# Patient Record
Sex: MALE | Race: WHITE | URBAN - METROPOLITAN AREA
[De-identification: names, ages, dates, MRNs, and addresses within clinical notes are randomized per-mention and may not be internally consistent; named-entity substitution may affect disease eponyms.]

---

## 2019-12-05 ENCOUNTER — APPOINTMENT (OUTPATIENT)
Dept: GENERAL RADIOLOGY | Age: 64
DRG: 638 | End: 2019-12-05
Payer: MEDICARE

## 2019-12-05 ENCOUNTER — HOSPITAL ENCOUNTER (INPATIENT)
Age: 64
LOS: 2 days | Discharge: HOME | DRG: 638 | End: 2019-12-07
Attending: EMERGENCY MEDICINE | Admitting: FAMILY MEDICINE
Payer: MEDICARE

## 2019-12-05 DIAGNOSIS — L03.90 CELLULITIS, UNSPECIFIED CELLULITIS SITE: Primary | ICD-10-CM

## 2019-12-05 LAB
ANION GAP SERPL CALCULATED.3IONS-SCNC: 10 MMOL/L (ref 9–17)
BUN BLDV-MCNC: 13 MG/DL (ref 8–23)
BUN/CREAT BLD: ABNORMAL (ref 9–20)
C-REACTIVE PROTEIN: 40.6 MG/L (ref 0–5)
CALCIUM SERPL-MCNC: 8.9 MG/DL (ref 8.6–10.4)
CHLORIDE BLD-SCNC: 100 MMOL/L (ref 98–107)
CO2: 25 MMOL/L (ref 20–31)
CREAT SERPL-MCNC: 1.02 MG/DL (ref 0.7–1.2)
GFR AFRICAN AMERICAN: >60 ML/MIN
GFR NON-AFRICAN AMERICAN: >60 ML/MIN
GFR SERPL CREATININE-BSD FRML MDRD: ABNORMAL ML/MIN/{1.73_M2}
GFR SERPL CREATININE-BSD FRML MDRD: ABNORMAL ML/MIN/{1.73_M2}
GLUCOSE BLD-MCNC: 150 MG/DL (ref 70–99)
HCT VFR BLD CALC: 44.2 % (ref 40.7–50.3)
HEMOGLOBIN: 14.3 G/DL (ref 13–17)
MCH RBC QN AUTO: 31.4 PG (ref 25.2–33.5)
MCHC RBC AUTO-ENTMCNC: 32.4 G/DL (ref 28.4–34.8)
MCV RBC AUTO: 96.9 FL (ref 82.6–102.9)
NRBC AUTOMATED: 0 PER 100 WBC
PDW BLD-RTO: 12.4 % (ref 11.8–14.4)
PLATELET # BLD: 253 K/UL (ref 138–453)
PMV BLD AUTO: 10 FL (ref 8.1–13.5)
POTASSIUM SERPL-SCNC: 4.2 MMOL/L (ref 3.7–5.3)
RBC # BLD: 4.56 M/UL (ref 4.21–5.77)
SEDIMENTATION RATE, ERYTHROCYTE: 30 MM (ref 0–10)
SODIUM BLD-SCNC: 135 MMOL/L (ref 135–144)
WBC # BLD: 8.8 K/UL (ref 3.5–11.3)

## 2019-12-05 PROCEDURE — 85027 COMPLETE CBC AUTOMATED: CPT

## 2019-12-05 PROCEDURE — 99223 1ST HOSP IP/OBS HIGH 75: CPT | Performed by: NURSE PRACTITIONER

## 2019-12-05 PROCEDURE — 73630 X-RAY EXAM OF FOOT: CPT

## 2019-12-05 PROCEDURE — 99285 EMERGENCY DEPT VISIT HI MDM: CPT

## 2019-12-05 PROCEDURE — 80048 BASIC METABOLIC PNL TOTAL CA: CPT

## 2019-12-05 PROCEDURE — 2580000003 HC RX 258: Performed by: STUDENT IN AN ORGANIZED HEALTH CARE EDUCATION/TRAINING PROGRAM

## 2019-12-05 PROCEDURE — 96365 THER/PROPH/DIAG IV INF INIT: CPT

## 2019-12-05 PROCEDURE — 96366 THER/PROPH/DIAG IV INF ADDON: CPT

## 2019-12-05 PROCEDURE — 86140 C-REACTIVE PROTEIN: CPT

## 2019-12-05 PROCEDURE — 96367 TX/PROPH/DG ADDL SEQ IV INF: CPT

## 2019-12-05 PROCEDURE — 6360000002 HC RX W HCPCS: Performed by: STUDENT IN AN ORGANIZED HEALTH CARE EDUCATION/TRAINING PROGRAM

## 2019-12-05 PROCEDURE — 85651 RBC SED RATE NONAUTOMATED: CPT

## 2019-12-05 PROCEDURE — 83036 HEMOGLOBIN GLYCOSYLATED A1C: CPT

## 2019-12-05 PROCEDURE — 1200000000 HC SEMI PRIVATE

## 2019-12-05 PROCEDURE — 87040 BLOOD CULTURE FOR BACTERIA: CPT

## 2019-12-05 RX ORDER — FUROSEMIDE 40 MG/1
40 TABLET ORAL 2 TIMES DAILY
Status: DISCONTINUED | OUTPATIENT
Start: 2019-12-06 | End: 2019-12-07 | Stop reason: HOSPADM

## 2019-12-05 RX ORDER — ONDANSETRON 2 MG/ML
4 INJECTION INTRAMUSCULAR; INTRAVENOUS EVERY 6 HOURS PRN
Status: DISCONTINUED | OUTPATIENT
Start: 2019-12-05 | End: 2019-12-07 | Stop reason: HOSPADM

## 2019-12-05 RX ORDER — CARVEDILOL 6.25 MG/1
6.25 TABLET ORAL 2 TIMES DAILY
COMMUNITY
Start: 2019-11-25

## 2019-12-05 RX ORDER — GLUCAGON 1 MG/ML
1 KIT INJECTION PRN
Status: DISCONTINUED | OUTPATIENT
Start: 2019-12-05 | End: 2019-12-07 | Stop reason: HOSPADM

## 2019-12-05 RX ORDER — LISINOPRIL 20 MG/1
40 TABLET ORAL DAILY
Status: DISCONTINUED | OUTPATIENT
Start: 2019-12-06 | End: 2019-12-07 | Stop reason: HOSPADM

## 2019-12-05 RX ORDER — POTASSIUM CHLORIDE 20 MEQ/1
40 TABLET, EXTENDED RELEASE ORAL PRN
Status: DISCONTINUED | OUTPATIENT
Start: 2019-12-05 | End: 2019-12-07 | Stop reason: HOSPADM

## 2019-12-05 RX ORDER — DEXTROSE MONOHYDRATE 25 G/50ML
12.5 INJECTION, SOLUTION INTRAVENOUS PRN
Status: DISCONTINUED | OUTPATIENT
Start: 2019-12-05 | End: 2019-12-07 | Stop reason: HOSPADM

## 2019-12-05 RX ORDER — SODIUM CHLORIDE 9 MG/ML
INJECTION, SOLUTION INTRAVENOUS CONTINUOUS
Status: DISCONTINUED | OUTPATIENT
Start: 2019-12-05 | End: 2019-12-06

## 2019-12-05 RX ORDER — ACETAMINOPHEN 325 MG/1
650 TABLET ORAL EVERY 4 HOURS PRN
Status: DISCONTINUED | OUTPATIENT
Start: 2019-12-05 | End: 2019-12-07 | Stop reason: HOSPADM

## 2019-12-05 RX ORDER — SODIUM CHLORIDE 0.9 % (FLUSH) 0.9 %
10 SYRINGE (ML) INJECTION EVERY 12 HOURS SCHEDULED
Status: DISCONTINUED | OUTPATIENT
Start: 2019-12-05 | End: 2019-12-07 | Stop reason: HOSPADM

## 2019-12-05 RX ORDER — LISINOPRIL 40 MG/1
1 TABLET ORAL
COMMUNITY
Start: 2019-11-23

## 2019-12-05 RX ORDER — SODIUM CHLORIDE 0.9 % (FLUSH) 0.9 %
10 SYRINGE (ML) INJECTION PRN
Status: DISCONTINUED | OUTPATIENT
Start: 2019-12-05 | End: 2019-12-07 | Stop reason: HOSPADM

## 2019-12-05 RX ORDER — POTASSIUM CHLORIDE 7.45 MG/ML
10 INJECTION INTRAVENOUS PRN
Status: DISCONTINUED | OUTPATIENT
Start: 2019-12-05 | End: 2019-12-07 | Stop reason: HOSPADM

## 2019-12-05 RX ORDER — ALBUTEROL SULFATE 90 UG/1
AEROSOL, METERED RESPIRATORY (INHALATION)
COMMUNITY

## 2019-12-05 RX ORDER — FUROSEMIDE 40 MG/1
40 TABLET ORAL
Status: ON HOLD | COMMUNITY
End: 2019-12-05

## 2019-12-05 RX ORDER — ATORVASTATIN CALCIUM 20 MG/1
20 TABLET, FILM COATED ORAL
COMMUNITY
End: 2020-08-14

## 2019-12-05 RX ORDER — MAGNESIUM SULFATE 1 G/100ML
1 INJECTION INTRAVENOUS PRN
Status: DISCONTINUED | OUTPATIENT
Start: 2019-12-05 | End: 2019-12-07 | Stop reason: HOSPADM

## 2019-12-05 RX ORDER — NICOTINE 21 MG/24HR
1 PATCH, TRANSDERMAL 24 HOURS TRANSDERMAL DAILY PRN
Status: DISCONTINUED | OUTPATIENT
Start: 2019-12-05 | End: 2019-12-07 | Stop reason: HOSPADM

## 2019-12-05 RX ORDER — ATORVASTATIN CALCIUM 20 MG/1
20 TABLET, FILM COATED ORAL NIGHTLY
Status: DISCONTINUED | OUTPATIENT
Start: 2019-12-05 | End: 2019-12-07 | Stop reason: HOSPADM

## 2019-12-05 RX ORDER — NICOTINE POLACRILEX 4 MG
15 LOZENGE BUCCAL PRN
Status: DISCONTINUED | OUTPATIENT
Start: 2019-12-05 | End: 2019-12-07 | Stop reason: HOSPADM

## 2019-12-05 RX ORDER — DEXTROSE MONOHYDRATE 50 MG/ML
100 INJECTION, SOLUTION INTRAVENOUS PRN
Status: DISCONTINUED | OUTPATIENT
Start: 2019-12-05 | End: 2019-12-07 | Stop reason: HOSPADM

## 2019-12-05 RX ADMIN — CEFEPIME HYDROCHLORIDE 2 G: 2 INJECTION, POWDER, FOR SOLUTION INTRAVENOUS at 18:07

## 2019-12-05 RX ADMIN — VANCOMYCIN HYDROCHLORIDE 1750 MG: 1 INJECTION, POWDER, LYOPHILIZED, FOR SOLUTION INTRAVENOUS at 18:38

## 2019-12-05 ASSESSMENT — PAIN DESCRIPTION - ORIENTATION
ORIENTATION: LEFT
ORIENTATION: LEFT

## 2019-12-05 ASSESSMENT — PAIN DESCRIPTION - LOCATION
LOCATION: FOOT
LOCATION: TOE (COMMENT WHICH ONE)

## 2019-12-05 ASSESSMENT — ENCOUNTER SYMPTOMS
SHORTNESS OF BREATH: 0
COLOR CHANGE: 1
NAUSEA: 0
CHEST TIGHTNESS: 0
ABDOMINAL DISTENTION: 0
VOMITING: 0
ABDOMINAL PAIN: 0
DIARRHEA: 0
CONSTIPATION: 0

## 2019-12-06 ENCOUNTER — APPOINTMENT (OUTPATIENT)
Dept: MRI IMAGING | Age: 64
DRG: 638 | End: 2019-12-06
Payer: MEDICARE

## 2019-12-06 PROBLEM — L97.509 CONTROLLED TYPE 2 DIABETES MELLITUS WITH FOOT ULCER, WITHOUT LONG-TERM CURRENT USE OF INSULIN (HCC): Status: ACTIVE | Noted: 2019-12-06

## 2019-12-06 PROBLEM — E11.621 CONTROLLED TYPE 2 DIABETES MELLITUS WITH FOOT ULCER, WITHOUT LONG-TERM CURRENT USE OF INSULIN (HCC): Status: ACTIVE | Noted: 2019-12-06

## 2019-12-06 PROBLEM — I50.9 CHRONIC HEART FAILURE (HCC): Status: ACTIVE | Noted: 2019-12-06

## 2019-12-06 PROBLEM — L97.529 FOOT ULCER, LEFT (HCC): Status: ACTIVE | Noted: 2019-12-06

## 2019-12-06 PROBLEM — I10 ESSENTIAL HYPERTENSION: Status: ACTIVE | Noted: 2019-12-06

## 2019-12-06 PROBLEM — I25.10 CORONARY ARTERY DISEASE INVOLVING NATIVE CORONARY ARTERY OF NATIVE HEART WITHOUT ANGINA PECTORIS: Status: ACTIVE | Noted: 2019-12-06

## 2019-12-06 PROBLEM — A49.02 MRSA INFECTION: Status: ACTIVE | Noted: 2019-12-06

## 2019-12-06 PROBLEM — J41.8 MIXED SIMPLE AND MUCOPURULENT CHRONIC BRONCHITIS (HCC): Status: ACTIVE | Noted: 2019-12-06

## 2019-12-06 PROBLEM — L03.116 CELLULITIS OF LEFT LOWER EXTREMITY: Status: ACTIVE | Noted: 2019-12-05

## 2019-12-06 LAB
ANION GAP SERPL CALCULATED.3IONS-SCNC: 9 MMOL/L (ref 9–17)
BUN BLDV-MCNC: 12 MG/DL (ref 8–23)
BUN/CREAT BLD: ABNORMAL (ref 9–20)
CALCIUM SERPL-MCNC: 8.4 MG/DL (ref 8.6–10.4)
CHLORIDE BLD-SCNC: 103 MMOL/L (ref 98–107)
CO2: 25 MMOL/L (ref 20–31)
CREAT SERPL-MCNC: 1.02 MG/DL (ref 0.7–1.2)
ESTIMATED AVERAGE GLUCOSE: 177 MG/DL
GFR AFRICAN AMERICAN: >60 ML/MIN
GFR NON-AFRICAN AMERICAN: >60 ML/MIN
GFR SERPL CREATININE-BSD FRML MDRD: ABNORMAL ML/MIN/{1.73_M2}
GFR SERPL CREATININE-BSD FRML MDRD: ABNORMAL ML/MIN/{1.73_M2}
GLUCOSE BLD-MCNC: 105 MG/DL (ref 75–110)
GLUCOSE BLD-MCNC: 142 MG/DL (ref 75–110)
GLUCOSE BLD-MCNC: 173 MG/DL (ref 75–110)
GLUCOSE BLD-MCNC: 175 MG/DL (ref 75–110)
GLUCOSE BLD-MCNC: 195 MG/DL (ref 70–99)
HBA1C MFR BLD: 7.8 % (ref 4–6)
HCT VFR BLD CALC: 39.6 % (ref 40.7–50.3)
HEMOGLOBIN: 12.8 G/DL (ref 13–17)
MCH RBC QN AUTO: 31.9 PG (ref 25.2–33.5)
MCHC RBC AUTO-ENTMCNC: 32.3 G/DL (ref 28.4–34.8)
MCV RBC AUTO: 98.8 FL (ref 82.6–102.9)
NRBC AUTOMATED: 0 PER 100 WBC
PDW BLD-RTO: 12.6 % (ref 11.8–14.4)
PLATELET # BLD: 220 K/UL (ref 138–453)
PMV BLD AUTO: 10.3 FL (ref 8.1–13.5)
POTASSIUM SERPL-SCNC: 4.4 MMOL/L (ref 3.7–5.3)
RBC # BLD: 4.01 M/UL (ref 4.21–5.77)
SODIUM BLD-SCNC: 137 MMOL/L (ref 135–144)
WBC # BLD: 7.7 K/UL (ref 3.5–11.3)

## 2019-12-06 PROCEDURE — 99232 SBSQ HOSP IP/OBS MODERATE 35: CPT | Performed by: FAMILY MEDICINE

## 2019-12-06 PROCEDURE — 6360000004 HC RX CONTRAST MEDICATION: Performed by: STUDENT IN AN ORGANIZED HEALTH CARE EDUCATION/TRAINING PROGRAM

## 2019-12-06 PROCEDURE — 36415 COLL VENOUS BLD VENIPUNCTURE: CPT

## 2019-12-06 PROCEDURE — 93970 EXTREMITY STUDY: CPT

## 2019-12-06 PROCEDURE — 2580000003 HC RX 258: Performed by: NURSE PRACTITIONER

## 2019-12-06 PROCEDURE — 6360000002 HC RX W HCPCS: Performed by: INTERNAL MEDICINE

## 2019-12-06 PROCEDURE — 93922 UPR/L XTREMITY ART 2 LEVELS: CPT

## 2019-12-06 PROCEDURE — 1200000000 HC SEMI PRIVATE

## 2019-12-06 PROCEDURE — 73720 MRI LWR EXTREMITY W/O&W/DYE: CPT

## 2019-12-06 PROCEDURE — 6370000000 HC RX 637 (ALT 250 FOR IP): Performed by: NURSE PRACTITIONER

## 2019-12-06 PROCEDURE — 82947 ASSAY GLUCOSE BLOOD QUANT: CPT

## 2019-12-06 PROCEDURE — A9576 INJ PROHANCE MULTIPACK: HCPCS | Performed by: STUDENT IN AN ORGANIZED HEALTH CARE EDUCATION/TRAINING PROGRAM

## 2019-12-06 PROCEDURE — 2580000003 HC RX 258: Performed by: INTERNAL MEDICINE

## 2019-12-06 PROCEDURE — 85027 COMPLETE CBC AUTOMATED: CPT

## 2019-12-06 PROCEDURE — 80048 BASIC METABOLIC PNL TOTAL CA: CPT

## 2019-12-06 PROCEDURE — 6360000002 HC RX W HCPCS: Performed by: NURSE PRACTITIONER

## 2019-12-06 RX ORDER — ACETAMINOPHEN 650 MG
TABLET, EXTENDED RELEASE ORAL
Status: DISPENSED
Start: 2019-12-06 | End: 2019-12-06

## 2019-12-06 RX ORDER — SODIUM CHLORIDE 0.9 % (FLUSH) 0.9 %
10 SYRINGE (ML) INJECTION 2 TIMES DAILY
Status: DISCONTINUED | OUTPATIENT
Start: 2019-12-06 | End: 2019-12-07 | Stop reason: HOSPADM

## 2019-12-06 RX ORDER — CARVEDILOL 6.25 MG/1
6.25 TABLET ORAL 2 TIMES DAILY
Status: DISCONTINUED | OUTPATIENT
Start: 2019-12-06 | End: 2019-12-07 | Stop reason: HOSPADM

## 2019-12-06 RX ADMIN — ATORVASTATIN CALCIUM 20 MG: 20 TABLET, FILM COATED ORAL at 20:30

## 2019-12-06 RX ADMIN — FUROSEMIDE 40 MG: 40 TABLET ORAL at 17:17

## 2019-12-06 RX ADMIN — SODIUM CHLORIDE: 9 INJECTION, SOLUTION INTRAVENOUS at 01:21

## 2019-12-06 RX ADMIN — GADOTERIDOL 20 ML: 279.3 INJECTION, SOLUTION INTRAVENOUS at 09:37

## 2019-12-06 RX ADMIN — Medication 10 ML: at 20:34

## 2019-12-06 RX ADMIN — FUROSEMIDE 40 MG: 40 TABLET ORAL at 09:59

## 2019-12-06 RX ADMIN — CARVEDILOL 6.25 MG: 6.25 TABLET, FILM COATED ORAL at 01:20

## 2019-12-06 RX ADMIN — INSULIN LISPRO 1 UNITS: 100 INJECTION, SOLUTION INTRAVENOUS; SUBCUTANEOUS at 20:34

## 2019-12-06 RX ADMIN — VANCOMYCIN HYDROCHLORIDE 1750 MG: 10 INJECTION, POWDER, LYOPHILIZED, FOR SOLUTION INTRAVENOUS at 06:24

## 2019-12-06 RX ADMIN — CARVEDILOL 6.25 MG: 6.25 TABLET, FILM COATED ORAL at 20:30

## 2019-12-06 RX ADMIN — ATORVASTATIN CALCIUM 20 MG: 20 TABLET, FILM COATED ORAL at 01:20

## 2019-12-06 RX ADMIN — CARVEDILOL 6.25 MG: 6.25 TABLET, FILM COATED ORAL at 08:08

## 2019-12-06 RX ADMIN — INSULIN LISPRO 1 UNITS: 100 INJECTION, SOLUTION INTRAVENOUS; SUBCUTANEOUS at 11:41

## 2019-12-06 RX ADMIN — ENOXAPARIN SODIUM 30 MG: 30 INJECTION SUBCUTANEOUS at 08:08

## 2019-12-06 RX ADMIN — LISINOPRIL 40 MG: 20 TABLET ORAL at 08:08

## 2019-12-06 RX ADMIN — INSULIN LISPRO 1 UNITS: 100 INJECTION, SOLUTION INTRAVENOUS; SUBCUTANEOUS at 16:54

## 2019-12-06 ASSESSMENT — ENCOUNTER SYMPTOMS
BACK PAIN: 0
VOMITING: 0
SINUS PRESSURE: 0
VOICE CHANGE: 0
CONSTIPATION: 0
SHORTNESS OF BREATH: 0
CHEST TIGHTNESS: 0
CHOKING: 0
ABDOMINAL PAIN: 0
WHEEZING: 0
BLOOD IN STOOL: 0
COUGH: 0
RHINORRHEA: 0
COLOR CHANGE: 1
NAUSEA: 0
STRIDOR: 0
DIARRHEA: 0

## 2019-12-07 VITALS
OXYGEN SATURATION: 93 % | WEIGHT: 269 LBS | TEMPERATURE: 97.9 F | HEIGHT: 71 IN | RESPIRATION RATE: 18 BRPM | BODY MASS INDEX: 37.66 KG/M2 | SYSTOLIC BLOOD PRESSURE: 119 MMHG | HEART RATE: 63 BPM | DIASTOLIC BLOOD PRESSURE: 77 MMHG

## 2019-12-07 LAB — GLUCOSE BLD-MCNC: 135 MG/DL (ref 75–110)

## 2019-12-07 PROCEDURE — 6370000000 HC RX 637 (ALT 250 FOR IP): Performed by: NURSE PRACTITIONER

## 2019-12-07 PROCEDURE — 6360000002 HC RX W HCPCS: Performed by: INTERNAL MEDICINE

## 2019-12-07 PROCEDURE — 2580000003 HC RX 258: Performed by: STUDENT IN AN ORGANIZED HEALTH CARE EDUCATION/TRAINING PROGRAM

## 2019-12-07 PROCEDURE — 6360000002 HC RX W HCPCS: Performed by: NURSE PRACTITIONER

## 2019-12-07 PROCEDURE — 82947 ASSAY GLUCOSE BLOOD QUANT: CPT

## 2019-12-07 PROCEDURE — 99232 SBSQ HOSP IP/OBS MODERATE 35: CPT | Performed by: FAMILY MEDICINE

## 2019-12-07 PROCEDURE — 2580000003 HC RX 258: Performed by: INTERNAL MEDICINE

## 2019-12-07 PROCEDURE — 2580000003 HC RX 258: Performed by: NURSE PRACTITIONER

## 2019-12-07 RX ORDER — DOXYCYCLINE 100 MG/1
100 TABLET ORAL 2 TIMES DAILY
Qty: 20 TABLET | Refills: 0 | Status: SHIPPED | OUTPATIENT
Start: 2019-12-07 | End: 2019-12-17

## 2019-12-07 RX ADMIN — VANCOMYCIN HYDROCHLORIDE 1750 MG: 10 INJECTION, POWDER, LYOPHILIZED, FOR SOLUTION INTRAVENOUS at 02:06

## 2019-12-07 RX ADMIN — CARVEDILOL 6.25 MG: 6.25 TABLET, FILM COATED ORAL at 08:24

## 2019-12-07 RX ADMIN — Medication 10 ML: at 02:06

## 2019-12-07 RX ADMIN — FUROSEMIDE 40 MG: 40 TABLET ORAL at 08:24

## 2019-12-07 RX ADMIN — LISINOPRIL 40 MG: 20 TABLET ORAL at 08:24

## 2019-12-07 RX ADMIN — ENOXAPARIN SODIUM 30 MG: 30 INJECTION SUBCUTANEOUS at 02:07

## 2019-12-07 RX ADMIN — Medication 10 ML: at 08:24

## 2019-12-07 ASSESSMENT — ENCOUNTER SYMPTOMS
CHOKING: 0
ABDOMINAL PAIN: 0
SHORTNESS OF BREATH: 0
CONSTIPATION: 0
COUGH: 0
CHEST TIGHTNESS: 0
WHEEZING: 0
VOICE CHANGE: 0
NAUSEA: 0
VOMITING: 0
RHINORRHEA: 0
BACK PAIN: 0
COLOR CHANGE: 1
DIARRHEA: 0
SINUS PRESSURE: 0

## 2019-12-07 ASSESSMENT — PAIN SCALES - GENERAL
PAINLEVEL_OUTOF10: 0
PAINLEVEL_OUTOF10: 0

## 2019-12-12 LAB
CULTURE: NORMAL
CULTURE: NORMAL
Lab: NORMAL
Lab: NORMAL
SPECIMEN DESCRIPTION: NORMAL
SPECIMEN DESCRIPTION: NORMAL

## 2020-08-14 ENCOUNTER — APPOINTMENT (OUTPATIENT)
Dept: CT IMAGING | Age: 65
DRG: 439 | End: 2020-08-14
Payer: MEDICARE

## 2020-08-14 ENCOUNTER — HOSPITAL ENCOUNTER (INPATIENT)
Age: 65
LOS: 5 days | Discharge: HOME | DRG: 439 | End: 2020-08-19
Attending: EMERGENCY MEDICINE | Admitting: INTERNAL MEDICINE
Payer: MEDICARE

## 2020-08-14 PROBLEM — K85.90 ACUTE PANCREATITIS: Status: ACTIVE | Noted: 2020-08-14

## 2020-08-14 LAB
ABSOLUTE EOS #: 0.12 K/UL (ref 0–0.44)
ABSOLUTE IMMATURE GRANULOCYTE: 0.09 K/UL (ref 0–0.3)
ABSOLUTE LYMPH #: 1.71 K/UL (ref 1.1–3.7)
ABSOLUTE MONO #: 0.9 K/UL (ref 0.1–1.2)
ALBUMIN SERPL-MCNC: 3.5 G/DL (ref 3.5–5.2)
ALBUMIN/GLOBULIN RATIO: ABNORMAL (ref 1–2.5)
ALP BLD-CCNC: 103 U/L (ref 40–129)
ALT SERPL-CCNC: 42 U/L (ref 5–41)
ANION GAP SERPL CALCULATED.3IONS-SCNC: 14 MMOL/L (ref 9–17)
AST SERPL-CCNC: 40 U/L
BASOPHILS # BLD: 0 % (ref 0–2)
BASOPHILS ABSOLUTE: 0.04 K/UL (ref 0–0.2)
BILIRUB SERPL-MCNC: 0.52 MG/DL (ref 0.3–1.2)
BILIRUBIN DIRECT: 0.16 MG/DL
BILIRUBIN, INDIRECT: 0.36 MG/DL (ref 0–1)
BUN BLDV-MCNC: 12 MG/DL (ref 8–23)
BUN/CREAT BLD: 13 (ref 9–20)
CALCIUM SERPL-MCNC: 9.5 MG/DL (ref 8.6–10.4)
CHLORIDE BLD-SCNC: 95 MMOL/L (ref 98–107)
CO2: 25 MMOL/L (ref 20–31)
CREAT SERPL-MCNC: 0.89 MG/DL (ref 0.7–1.2)
DIFFERENTIAL TYPE: ABNORMAL
EOSINOPHILS RELATIVE PERCENT: 1 % (ref 1–4)
ETHANOL PERCENT: <0.01 %
ETHANOL: <10 MG/DL
GFR AFRICAN AMERICAN: >60 ML/MIN
GFR NON-AFRICAN AMERICAN: >60 ML/MIN
GFR SERPL CREATININE-BSD FRML MDRD: ABNORMAL ML/MIN/{1.73_M2}
GFR SERPL CREATININE-BSD FRML MDRD: ABNORMAL ML/MIN/{1.73_M2}
GLOBULIN: ABNORMAL G/DL (ref 1.5–3.8)
GLUCOSE BLD-MCNC: 234 MG/DL (ref 70–99)
HCT VFR BLD CALC: 45.7 % (ref 40.7–50.3)
HEMOGLOBIN: 15.3 G/DL (ref 13–17)
IMMATURE GRANULOCYTES: 1 %
LIPASE: 1200 U/L (ref 13–60)
LYMPHOCYTES # BLD: 13 % (ref 24–43)
MCH RBC QN AUTO: 31.9 PG (ref 25.2–33.5)
MCHC RBC AUTO-ENTMCNC: 33.5 G/DL (ref 28.4–34.8)
MCV RBC AUTO: 95.4 FL (ref 82.6–102.9)
MONOCYTES # BLD: 7 % (ref 3–12)
NRBC AUTOMATED: 0 PER 100 WBC
PDW BLD-RTO: 12.5 % (ref 11.8–14.4)
PLATELET # BLD: 199 K/UL (ref 138–453)
PLATELET ESTIMATE: ABNORMAL
PMV BLD AUTO: 9.9 FL (ref 8.1–13.5)
POTASSIUM SERPL-SCNC: 3.9 MMOL/L (ref 3.7–5.3)
RBC # BLD: 4.79 M/UL (ref 4.21–5.77)
RBC # BLD: ABNORMAL 10*6/UL
SEG NEUTROPHILS: 78 % (ref 36–65)
SEGMENTED NEUTROPHILS ABSOLUTE COUNT: 10.38 K/UL (ref 1.5–8.1)
SODIUM BLD-SCNC: 134 MMOL/L (ref 135–144)
TOTAL PROTEIN: 7 G/DL (ref 6.4–8.3)
TROPONIN INTERP: NORMAL
TROPONIN T: NORMAL NG/ML
TROPONIN, HIGH SENSITIVITY: 13 NG/L (ref 0–22)
WBC # BLD: 13.2 K/UL (ref 3.5–11.3)
WBC # BLD: ABNORMAL 10*3/UL

## 2020-08-14 PROCEDURE — 84484 ASSAY OF TROPONIN QUANT: CPT

## 2020-08-14 PROCEDURE — 6360000004 HC RX CONTRAST MEDICATION: Performed by: EMERGENCY MEDICINE

## 2020-08-14 PROCEDURE — 96376 TX/PRO/DX INJ SAME DRUG ADON: CPT

## 2020-08-14 PROCEDURE — 80076 HEPATIC FUNCTION PANEL: CPT

## 2020-08-14 PROCEDURE — 74177 CT ABD & PELVIS W/CONTRAST: CPT

## 2020-08-14 PROCEDURE — 6370000000 HC RX 637 (ALT 250 FOR IP): Performed by: NURSE PRACTITIONER

## 2020-08-14 PROCEDURE — 85025 COMPLETE CBC W/AUTO DIFF WBC: CPT

## 2020-08-14 PROCEDURE — 99222 1ST HOSP IP/OBS MODERATE 55: CPT | Performed by: NURSE PRACTITIONER

## 2020-08-14 PROCEDURE — G0480 DRUG TEST DEF 1-7 CLASSES: HCPCS

## 2020-08-14 PROCEDURE — 83690 ASSAY OF LIPASE: CPT

## 2020-08-14 PROCEDURE — 80048 BASIC METABOLIC PNL TOTAL CA: CPT

## 2020-08-14 PROCEDURE — 96375 TX/PRO/DX INJ NEW DRUG ADDON: CPT

## 2020-08-14 PROCEDURE — 93005 ELECTROCARDIOGRAM TRACING: CPT | Performed by: EMERGENCY MEDICINE

## 2020-08-14 PROCEDURE — 96374 THER/PROPH/DIAG INJ IV PUSH: CPT

## 2020-08-14 PROCEDURE — 99285 EMERGENCY DEPT VISIT HI MDM: CPT

## 2020-08-14 PROCEDURE — 2500000003 HC RX 250 WO HCPCS: Performed by: NURSE PRACTITIONER

## 2020-08-14 PROCEDURE — 6360000002 HC RX W HCPCS: Performed by: EMERGENCY MEDICINE

## 2020-08-14 PROCEDURE — 2580000003 HC RX 258: Performed by: EMERGENCY MEDICINE

## 2020-08-14 PROCEDURE — 6360000002 HC RX W HCPCS: Performed by: NURSE PRACTITIONER

## 2020-08-14 PROCEDURE — 2580000003 HC RX 258: Performed by: NURSE PRACTITIONER

## 2020-08-14 PROCEDURE — 1200000000 HC SEMI PRIVATE

## 2020-08-14 RX ORDER — LORAZEPAM 2 MG/ML
4 INJECTION INTRAMUSCULAR
Status: DISCONTINUED | OUTPATIENT
Start: 2020-08-14 | End: 2020-08-19 | Stop reason: HOSPADM

## 2020-08-14 RX ORDER — HYDROMORPHONE HYDROCHLORIDE 1 MG/ML
1 INJECTION, SOLUTION INTRAMUSCULAR; INTRAVENOUS; SUBCUTANEOUS ONCE
Status: COMPLETED | OUTPATIENT
Start: 2020-08-14 | End: 2020-08-14

## 2020-08-14 RX ORDER — SODIUM CHLORIDE, SODIUM LACTATE, POTASSIUM CHLORIDE, CALCIUM CHLORIDE 600; 310; 30; 20 MG/100ML; MG/100ML; MG/100ML; MG/100ML
INJECTION, SOLUTION INTRAVENOUS CONTINUOUS
Status: DISCONTINUED | OUTPATIENT
Start: 2020-08-15 | End: 2020-08-15

## 2020-08-14 RX ORDER — SODIUM CHLORIDE 0.9 % (FLUSH) 0.9 %
10 SYRINGE (ML) INJECTION PRN
Status: DISCONTINUED | OUTPATIENT
Start: 2020-08-14 | End: 2020-08-15 | Stop reason: SDUPTHER

## 2020-08-14 RX ORDER — MULTIVITAMIN WITH IRON
1 TABLET ORAL DAILY
Status: DISCONTINUED | OUTPATIENT
Start: 2020-08-14 | End: 2020-08-19 | Stop reason: HOSPADM

## 2020-08-14 RX ORDER — SODIUM CHLORIDE 0.9 % (FLUSH) 0.9 %
10 SYRINGE (ML) INJECTION ONCE
Status: COMPLETED | OUTPATIENT
Start: 2020-08-14 | End: 2020-08-14

## 2020-08-14 RX ORDER — SODIUM CHLORIDE 9 MG/ML
INJECTION, SOLUTION INTRAVENOUS CONTINUOUS
Status: DISCONTINUED | OUTPATIENT
Start: 2020-08-14 | End: 2020-08-15

## 2020-08-14 RX ORDER — LORAZEPAM 2 MG/ML
3 INJECTION INTRAMUSCULAR
Status: DISCONTINUED | OUTPATIENT
Start: 2020-08-14 | End: 2020-08-19 | Stop reason: HOSPADM

## 2020-08-14 RX ORDER — LISINOPRIL 40 MG/1
40 TABLET ORAL DAILY
Status: DISCONTINUED | OUTPATIENT
Start: 2020-08-14 | End: 2020-08-19 | Stop reason: HOSPADM

## 2020-08-14 RX ORDER — HYDROCODONE BITARTRATE AND ACETAMINOPHEN 5; 325 MG/1; MG/1
2 TABLET ORAL EVERY 4 HOURS PRN
Status: DISCONTINUED | OUTPATIENT
Start: 2020-08-14 | End: 2020-08-16

## 2020-08-14 RX ORDER — LORAZEPAM 2 MG/ML
2 INJECTION INTRAMUSCULAR
Status: DISCONTINUED | OUTPATIENT
Start: 2020-08-14 | End: 2020-08-19 | Stop reason: HOSPADM

## 2020-08-14 RX ORDER — ONDANSETRON 2 MG/ML
4 INJECTION INTRAMUSCULAR; INTRAVENOUS EVERY 6 HOURS PRN
Status: DISCONTINUED | OUTPATIENT
Start: 2020-08-14 | End: 2020-08-19 | Stop reason: HOSPADM

## 2020-08-14 RX ORDER — ACETAMINOPHEN 325 MG/1
650 TABLET ORAL EVERY 4 HOURS PRN
Status: DISCONTINUED | OUTPATIENT
Start: 2020-08-14 | End: 2020-08-19 | Stop reason: HOSPADM

## 2020-08-14 RX ORDER — LORAZEPAM 1 MG/1
2 TABLET ORAL
Status: DISCONTINUED | OUTPATIENT
Start: 2020-08-14 | End: 2020-08-19 | Stop reason: HOSPADM

## 2020-08-14 RX ORDER — LORAZEPAM 1 MG/1
1 TABLET ORAL
Status: DISCONTINUED | OUTPATIENT
Start: 2020-08-14 | End: 2020-08-19 | Stop reason: HOSPADM

## 2020-08-14 RX ORDER — POTASSIUM CHLORIDE 7.45 MG/ML
10 INJECTION INTRAVENOUS PRN
Status: DISCONTINUED | OUTPATIENT
Start: 2020-08-14 | End: 2020-08-19 | Stop reason: HOSPADM

## 2020-08-14 RX ORDER — BUDESONIDE AND FORMOTEROL FUMARATE DIHYDRATE 80; 4.5 UG/1; UG/1
2 AEROSOL RESPIRATORY (INHALATION) 2 TIMES DAILY
Status: CANCELLED | OUTPATIENT
Start: 2020-08-14

## 2020-08-14 RX ORDER — 0.9 % SODIUM CHLORIDE 0.9 %
80 INTRAVENOUS SOLUTION INTRAVENOUS ONCE
Status: COMPLETED | OUTPATIENT
Start: 2020-08-14 | End: 2020-08-14

## 2020-08-14 RX ORDER — MORPHINE SULFATE 4 MG/ML
4 INJECTION, SOLUTION INTRAMUSCULAR; INTRAVENOUS ONCE
Status: COMPLETED | OUTPATIENT
Start: 2020-08-14 | End: 2020-08-14

## 2020-08-14 RX ORDER — THIAMINE MONONITRATE (VIT B1) 100 MG
100 TABLET ORAL DAILY
Status: DISCONTINUED | OUTPATIENT
Start: 2020-08-14 | End: 2020-08-15

## 2020-08-14 RX ORDER — PROMETHAZINE HYDROCHLORIDE 25 MG/1
12.5 TABLET ORAL EVERY 6 HOURS PRN
Status: DISCONTINUED | OUTPATIENT
Start: 2020-08-14 | End: 2020-08-19 | Stop reason: HOSPADM

## 2020-08-14 RX ORDER — LORAZEPAM 1 MG/1
3 TABLET ORAL
Status: DISCONTINUED | OUTPATIENT
Start: 2020-08-14 | End: 2020-08-19 | Stop reason: HOSPADM

## 2020-08-14 RX ORDER — SODIUM CHLORIDE, SODIUM LACTATE, POTASSIUM CHLORIDE, CALCIUM CHLORIDE 600; 310; 30; 20 MG/100ML; MG/100ML; MG/100ML; MG/100ML
INJECTION, SOLUTION INTRAVENOUS CONTINUOUS
Status: DISCONTINUED | OUTPATIENT
Start: 2020-08-14 | End: 2020-08-15

## 2020-08-14 RX ORDER — CARVEDILOL 3.12 MG/1
6.25 TABLET ORAL 2 TIMES DAILY
Status: DISCONTINUED | OUTPATIENT
Start: 2020-08-14 | End: 2020-08-19 | Stop reason: HOSPADM

## 2020-08-14 RX ORDER — MAGNESIUM SULFATE 1 G/100ML
1 INJECTION INTRAVENOUS PRN
Status: DISCONTINUED | OUTPATIENT
Start: 2020-08-14 | End: 2020-08-19 | Stop reason: HOSPADM

## 2020-08-14 RX ORDER — ATORVASTATIN CALCIUM 20 MG/1
20 TABLET, FILM COATED ORAL NIGHTLY
Status: CANCELLED | OUTPATIENT
Start: 2020-08-14

## 2020-08-14 RX ORDER — LORAZEPAM 2 MG/ML
1 INJECTION INTRAMUSCULAR
Status: DISCONTINUED | OUTPATIENT
Start: 2020-08-14 | End: 2020-08-19 | Stop reason: HOSPADM

## 2020-08-14 RX ORDER — CARVEDILOL 3.12 MG/1
6.25 TABLET ORAL 2 TIMES DAILY
Status: DISCONTINUED | OUTPATIENT
Start: 2020-08-14 | End: 2020-08-14

## 2020-08-14 RX ORDER — ONDANSETRON 2 MG/ML
4 INJECTION INTRAMUSCULAR; INTRAVENOUS ONCE
Status: COMPLETED | OUTPATIENT
Start: 2020-08-14 | End: 2020-08-14

## 2020-08-14 RX ORDER — FUROSEMIDE 40 MG/1
40 TABLET ORAL DAILY
Status: DISCONTINUED | OUTPATIENT
Start: 2020-08-14 | End: 2020-08-15

## 2020-08-14 RX ORDER — HYDROMORPHONE HYDROCHLORIDE 1 MG/ML
0.5 INJECTION, SOLUTION INTRAMUSCULAR; INTRAVENOUS; SUBCUTANEOUS
Status: DISCONTINUED | OUTPATIENT
Start: 2020-08-14 | End: 2020-08-16

## 2020-08-14 RX ORDER — HYDROMORPHONE HYDROCHLORIDE 1 MG/ML
0.25 INJECTION, SOLUTION INTRAMUSCULAR; INTRAVENOUS; SUBCUTANEOUS
Status: DISCONTINUED | OUTPATIENT
Start: 2020-08-14 | End: 2020-08-16

## 2020-08-14 RX ORDER — HYDROCODONE BITARTRATE AND ACETAMINOPHEN 5; 325 MG/1; MG/1
1 TABLET ORAL EVERY 4 HOURS PRN
Status: DISCONTINUED | OUTPATIENT
Start: 2020-08-14 | End: 2020-08-16

## 2020-08-14 RX ORDER — LORAZEPAM 1 MG/1
4 TABLET ORAL
Status: DISCONTINUED | OUTPATIENT
Start: 2020-08-14 | End: 2020-08-19 | Stop reason: HOSPADM

## 2020-08-14 RX ORDER — SODIUM CHLORIDE 0.9 % (FLUSH) 0.9 %
10 SYRINGE (ML) INJECTION EVERY 12 HOURS SCHEDULED
Status: DISCONTINUED | OUTPATIENT
Start: 2020-08-14 | End: 2020-08-15 | Stop reason: SDUPTHER

## 2020-08-14 RX ADMIN — SODIUM CHLORIDE: 9 INJECTION, SOLUTION INTRAVENOUS at 18:49

## 2020-08-14 RX ADMIN — CARVEDILOL 6.25 MG: 3.12 TABLET, FILM COATED ORAL at 18:49

## 2020-08-14 RX ADMIN — FUROSEMIDE 40 MG: 40 TABLET ORAL at 18:49

## 2020-08-14 RX ADMIN — LORAZEPAM 1 MG: 2 INJECTION, SOLUTION INTRAMUSCULAR; INTRAVENOUS at 21:52

## 2020-08-14 RX ADMIN — SODIUM CHLORIDE 80 ML: 9 INJECTION, SOLUTION INTRAVENOUS at 16:00

## 2020-08-14 RX ADMIN — ONDANSETRON 4 MG: 2 INJECTION INTRAMUSCULAR; INTRAVENOUS at 20:35

## 2020-08-14 RX ADMIN — FAMOTIDINE 20 MG: 10 INJECTION, SOLUTION INTRAVENOUS at 20:35

## 2020-08-14 RX ADMIN — IOPAMIDOL 75 ML: 755 INJECTION, SOLUTION INTRAVENOUS at 16:00

## 2020-08-14 RX ADMIN — ONDANSETRON 4 MG: 2 INJECTION INTRAMUSCULAR; INTRAVENOUS at 14:58

## 2020-08-14 RX ADMIN — HYDROMORPHONE HYDROCHLORIDE 1 MG: 1 INJECTION, SOLUTION INTRAMUSCULAR; INTRAVENOUS; SUBCUTANEOUS at 16:52

## 2020-08-14 RX ADMIN — MORPHINE SULFATE 4 MG: 4 INJECTION, SOLUTION INTRAMUSCULAR; INTRAVENOUS at 14:58

## 2020-08-14 RX ADMIN — Medication 10 ML: at 16:00

## 2020-08-14 RX ADMIN — HYDROMORPHONE HYDROCHLORIDE 0.5 MG: 1 INJECTION, SOLUTION INTRAMUSCULAR; INTRAVENOUS; SUBCUTANEOUS at 20:30

## 2020-08-14 RX ADMIN — HYDROMORPHONE HYDROCHLORIDE 1 MG: 1 INJECTION, SOLUTION INTRAMUSCULAR; INTRAVENOUS; SUBCUTANEOUS at 15:53

## 2020-08-14 ASSESSMENT — ENCOUNTER SYMPTOMS
ABDOMINAL DISTENTION: 1
ABDOMINAL PAIN: 1

## 2020-08-14 ASSESSMENT — PAIN SCALES - GENERAL
PAINLEVEL_OUTOF10: 10

## 2020-08-15 LAB
ALBUMIN SERPL-MCNC: 3.4 G/DL (ref 3.5–5.2)
ALBUMIN/GLOBULIN RATIO: ABNORMAL (ref 1–2.5)
ALP BLD-CCNC: 111 U/L (ref 40–129)
ALT SERPL-CCNC: 35 U/L (ref 5–41)
AMYLASE: 249 U/L (ref 28–100)
ANION GAP SERPL CALCULATED.3IONS-SCNC: 12 MMOL/L (ref 9–17)
AST SERPL-CCNC: 38 U/L
BILIRUB SERPL-MCNC: 0.48 MG/DL (ref 0.3–1.2)
BUN BLDV-MCNC: 10 MG/DL (ref 8–23)
BUN/CREAT BLD: 11 (ref 9–20)
CALCIUM IONIZED: 1.15 MMOL/L (ref 1.13–1.33)
CALCIUM SERPL-MCNC: 8.2 MG/DL (ref 8.6–10.4)
CHLORIDE BLD-SCNC: 98 MMOL/L (ref 98–107)
CO2: 27 MMOL/L (ref 20–31)
CREAT SERPL-MCNC: 0.93 MG/DL (ref 0.7–1.2)
GFR AFRICAN AMERICAN: >60 ML/MIN
GFR NON-AFRICAN AMERICAN: >60 ML/MIN
GFR SERPL CREATININE-BSD FRML MDRD: ABNORMAL ML/MIN/{1.73_M2}
GFR SERPL CREATININE-BSD FRML MDRD: ABNORMAL ML/MIN/{1.73_M2}
GLUCOSE BLD-MCNC: 216 MG/DL (ref 75–110)
GLUCOSE BLD-MCNC: 221 MG/DL (ref 75–110)
GLUCOSE BLD-MCNC: 232 MG/DL (ref 75–110)
GLUCOSE BLD-MCNC: 237 MG/DL (ref 75–110)
GLUCOSE BLD-MCNC: 255 MG/DL (ref 70–99)
GLUCOSE BLD-MCNC: 273 MG/DL (ref 75–110)
HCT VFR BLD CALC: 48.2 % (ref 40.7–50.3)
HEMOGLOBIN: 15.7 G/DL (ref 13–17)
INR BLD: 1
LIPASE: 581 U/L (ref 13–60)
MAGNESIUM: 2.3 MG/DL (ref 1.6–2.6)
MCH RBC QN AUTO: 31.5 PG (ref 25.2–33.5)
MCHC RBC AUTO-ENTMCNC: 32.6 G/DL (ref 28.4–34.8)
MCV RBC AUTO: 96.8 FL (ref 82.6–102.9)
NRBC AUTOMATED: 0 PER 100 WBC
PDW BLD-RTO: 13 % (ref 11.8–14.4)
PHOSPHORUS: 2.4 MG/DL (ref 2.5–4.5)
PLATELET # BLD: 215 K/UL (ref 138–453)
PMV BLD AUTO: 9.9 FL (ref 8.1–13.5)
POTASSIUM SERPL-SCNC: 4.2 MMOL/L (ref 3.7–5.3)
PROTHROMBIN TIME: 13.1 SEC (ref 11.5–14.2)
RBC # BLD: 4.98 M/UL (ref 4.21–5.77)
SODIUM BLD-SCNC: 137 MMOL/L (ref 135–144)
TOTAL PROTEIN: 6.8 G/DL (ref 6.4–8.3)
TRIGL SERPL-MCNC: 225 MG/DL
TSH SERPL DL<=0.05 MIU/L-ACNC: 0.96 MIU/L (ref 0.3–5)
WBC # BLD: 17.9 K/UL (ref 3.5–11.3)

## 2020-08-15 PROCEDURE — 85610 PROTHROMBIN TIME: CPT

## 2020-08-15 PROCEDURE — 6360000002 HC RX W HCPCS: Performed by: NURSE PRACTITIONER

## 2020-08-15 PROCEDURE — 2580000003 HC RX 258: Performed by: NURSE PRACTITIONER

## 2020-08-15 PROCEDURE — 84100 ASSAY OF PHOSPHORUS: CPT

## 2020-08-15 PROCEDURE — 2580000003 HC RX 258: Performed by: INTERNAL MEDICINE

## 2020-08-15 PROCEDURE — 85027 COMPLETE CBC AUTOMATED: CPT

## 2020-08-15 PROCEDURE — 94640 AIRWAY INHALATION TREATMENT: CPT

## 2020-08-15 PROCEDURE — 2500000003 HC RX 250 WO HCPCS: Performed by: NURSE PRACTITIONER

## 2020-08-15 PROCEDURE — 6370000000 HC RX 637 (ALT 250 FOR IP): Performed by: NURSE PRACTITIONER

## 2020-08-15 PROCEDURE — 80053 COMPREHEN METABOLIC PANEL: CPT

## 2020-08-15 PROCEDURE — 82150 ASSAY OF AMYLASE: CPT

## 2020-08-15 PROCEDURE — 94761 N-INVAS EAR/PLS OXIMETRY MLT: CPT

## 2020-08-15 PROCEDURE — 83690 ASSAY OF LIPASE: CPT

## 2020-08-15 PROCEDURE — 6370000000 HC RX 637 (ALT 250 FOR IP): Performed by: INTERNAL MEDICINE

## 2020-08-15 PROCEDURE — 83735 ASSAY OF MAGNESIUM: CPT

## 2020-08-15 PROCEDURE — 36415 COLL VENOUS BLD VENIPUNCTURE: CPT

## 2020-08-15 PROCEDURE — 83036 HEMOGLOBIN GLYCOSYLATED A1C: CPT

## 2020-08-15 PROCEDURE — 84443 ASSAY THYROID STIM HORMONE: CPT

## 2020-08-15 PROCEDURE — 99222 1ST HOSP IP/OBS MODERATE 55: CPT | Performed by: INTERNAL MEDICINE

## 2020-08-15 PROCEDURE — 2700000000 HC OXYGEN THERAPY PER DAY

## 2020-08-15 PROCEDURE — 82947 ASSAY GLUCOSE BLOOD QUANT: CPT

## 2020-08-15 PROCEDURE — 99232 SBSQ HOSP IP/OBS MODERATE 35: CPT | Performed by: INTERNAL MEDICINE

## 2020-08-15 PROCEDURE — 82330 ASSAY OF CALCIUM: CPT

## 2020-08-15 PROCEDURE — 84478 ASSAY OF TRIGLYCERIDES: CPT

## 2020-08-15 PROCEDURE — 1200000000 HC SEMI PRIVATE

## 2020-08-15 RX ORDER — NICOTINE POLACRILEX 4 MG
15 LOZENGE BUCCAL PRN
Status: DISCONTINUED | OUTPATIENT
Start: 2020-08-15 | End: 2020-08-19 | Stop reason: HOSPADM

## 2020-08-15 RX ORDER — SODIUM CHLORIDE, SODIUM LACTATE, POTASSIUM CHLORIDE, CALCIUM CHLORIDE 600; 310; 30; 20 MG/100ML; MG/100ML; MG/100ML; MG/100ML
INJECTION, SOLUTION INTRAVENOUS CONTINUOUS
Status: DISCONTINUED | OUTPATIENT
Start: 2020-08-15 | End: 2020-08-19

## 2020-08-15 RX ORDER — LORAZEPAM 2 MG/ML
1 INJECTION INTRAMUSCULAR ONCE
Status: COMPLETED | OUTPATIENT
Start: 2020-08-15 | End: 2020-08-15

## 2020-08-15 RX ORDER — SODIUM CHLORIDE 0.9 % (FLUSH) 0.9 %
10 SYRINGE (ML) INJECTION PRN
Status: DISCONTINUED | OUTPATIENT
Start: 2020-08-15 | End: 2020-08-19 | Stop reason: HOSPADM

## 2020-08-15 RX ORDER — FOLIC ACID 1 MG/1
1 TABLET ORAL DAILY
Status: DISCONTINUED | OUTPATIENT
Start: 2020-08-15 | End: 2020-08-15

## 2020-08-15 RX ORDER — DEXTROSE MONOHYDRATE 50 MG/ML
100 INJECTION, SOLUTION INTRAVENOUS PRN
Status: DISCONTINUED | OUTPATIENT
Start: 2020-08-15 | End: 2020-08-19 | Stop reason: HOSPADM

## 2020-08-15 RX ORDER — DEXTROSE MONOHYDRATE 25 G/50ML
12.5 INJECTION, SOLUTION INTRAVENOUS PRN
Status: DISCONTINUED | OUTPATIENT
Start: 2020-08-15 | End: 2020-08-19 | Stop reason: HOSPADM

## 2020-08-15 RX ORDER — HYDRALAZINE HYDROCHLORIDE 20 MG/ML
10 INJECTION INTRAMUSCULAR; INTRAVENOUS ONCE
Status: COMPLETED | OUTPATIENT
Start: 2020-08-15 | End: 2020-08-15

## 2020-08-15 RX ORDER — SODIUM CHLORIDE 0.9 % (FLUSH) 0.9 %
10 SYRINGE (ML) INJECTION EVERY 12 HOURS SCHEDULED
Status: DISCONTINUED | OUTPATIENT
Start: 2020-08-15 | End: 2020-08-19 | Stop reason: HOSPADM

## 2020-08-15 RX ORDER — METHADONE HYDROCHLORIDE 10 MG/1
70 TABLET ORAL DAILY
Status: DISCONTINUED | OUTPATIENT
Start: 2020-08-15 | End: 2020-08-17

## 2020-08-15 RX ORDER — ALBUTEROL SULFATE 90 UG/1
2 AEROSOL, METERED RESPIRATORY (INHALATION) EVERY 6 HOURS PRN
Status: DISCONTINUED | OUTPATIENT
Start: 2020-08-15 | End: 2020-08-16 | Stop reason: SDUPTHER

## 2020-08-15 RX ORDER — SODIUM CHLORIDE, SODIUM LACTATE, POTASSIUM CHLORIDE, CALCIUM CHLORIDE 600; 310; 30; 20 MG/100ML; MG/100ML; MG/100ML; MG/100ML
INJECTION, SOLUTION INTRAVENOUS CONTINUOUS
Status: DISCONTINUED | OUTPATIENT
Start: 2020-08-15 | End: 2020-08-15

## 2020-08-15 RX ADMIN — LORAZEPAM 1 MG: 2 INJECTION, SOLUTION INTRAMUSCULAR; INTRAVENOUS at 00:48

## 2020-08-15 RX ADMIN — SODIUM CHLORIDE: 9 INJECTION, SOLUTION INTRAVENOUS at 01:53

## 2020-08-15 RX ADMIN — SODIUM CHLORIDE, POTASSIUM CHLORIDE, SODIUM LACTATE AND CALCIUM CHLORIDE: 600; 310; 30; 20 INJECTION, SOLUTION INTRAVENOUS at 15:30

## 2020-08-15 RX ADMIN — HYDROMORPHONE HYDROCHLORIDE 0.5 MG: 1 INJECTION, SOLUTION INTRAMUSCULAR; INTRAVENOUS; SUBCUTANEOUS at 01:53

## 2020-08-15 RX ADMIN — HYDRALAZINE HYDROCHLORIDE 10 MG: 20 INJECTION INTRAMUSCULAR; INTRAVENOUS at 01:23

## 2020-08-15 RX ADMIN — METHADONE HYDROCHLORIDE 70 MG: 10 TABLET ORAL at 10:11

## 2020-08-15 RX ADMIN — FAMOTIDINE 20 MG: 10 INJECTION, SOLUTION INTRAVENOUS at 21:06

## 2020-08-15 RX ADMIN — LISINOPRIL 40 MG: 40 TABLET ORAL at 10:11

## 2020-08-15 RX ADMIN — LORAZEPAM 1 MG: 2 INJECTION, SOLUTION INTRAMUSCULAR; INTRAVENOUS at 02:05

## 2020-08-15 RX ADMIN — INSULIN LISPRO 2 UNITS: 100 INJECTION, SOLUTION INTRAVENOUS; SUBCUTANEOUS at 01:29

## 2020-08-15 RX ADMIN — ONDANSETRON 4 MG: 2 INJECTION INTRAMUSCULAR; INTRAVENOUS at 09:37

## 2020-08-15 RX ADMIN — SODIUM CHLORIDE, POTASSIUM CHLORIDE, SODIUM LACTATE AND CALCIUM CHLORIDE: 600; 310; 30; 20 INJECTION, SOLUTION INTRAVENOUS at 09:36

## 2020-08-15 RX ADMIN — INSULIN LISPRO 2 UNITS: 100 INJECTION, SOLUTION INTRAVENOUS; SUBCUTANEOUS at 21:08

## 2020-08-15 RX ADMIN — ALBUTEROL SULFATE 2 PUFF: 90 AEROSOL, METERED RESPIRATORY (INHALATION) at 20:53

## 2020-08-15 RX ADMIN — LISINOPRIL 40 MG: 40 TABLET ORAL at 00:49

## 2020-08-15 RX ADMIN — HYDROMORPHONE HYDROCHLORIDE 0.5 MG: 1 INJECTION, SOLUTION INTRAMUSCULAR; INTRAVENOUS; SUBCUTANEOUS at 06:55

## 2020-08-15 RX ADMIN — CARVEDILOL 6.25 MG: 3.12 TABLET, FILM COATED ORAL at 10:11

## 2020-08-15 RX ADMIN — CARVEDILOL 6.25 MG: 3.12 TABLET, FILM COATED ORAL at 21:05

## 2020-08-15 RX ADMIN — FAMOTIDINE 20 MG: 10 INJECTION, SOLUTION INTRAVENOUS at 09:37

## 2020-08-15 RX ADMIN — ONDANSETRON 4 MG: 2 INJECTION INTRAMUSCULAR; INTRAVENOUS at 01:53

## 2020-08-15 RX ADMIN — LORAZEPAM 1 MG: 2 INJECTION, SOLUTION INTRAMUSCULAR; INTRAVENOUS at 21:06

## 2020-08-15 RX ADMIN — Medication 10 ML: at 09:37

## 2020-08-15 RX ADMIN — ENOXAPARIN SODIUM 30 MG: 30 INJECTION SUBCUTANEOUS at 01:23

## 2020-08-15 ASSESSMENT — ENCOUNTER SYMPTOMS
CHEST TIGHTNESS: 0
COLOR CHANGE: 0
ABDOMINAL PAIN: 1
SHORTNESS OF BREATH: 0
DIARRHEA: 0
BLOOD IN STOOL: 0
WHEEZING: 0
VOMITING: 0
NAUSEA: 1
COUGH: 0

## 2020-08-15 ASSESSMENT — PAIN DESCRIPTION - PAIN TYPE
TYPE: ACUTE PAIN

## 2020-08-15 ASSESSMENT — PAIN SCALES - GENERAL
PAINLEVEL_OUTOF10: 6
PAINLEVEL_OUTOF10: 10

## 2020-08-15 ASSESSMENT — PAIN DESCRIPTION - LOCATION
LOCATION: ABDOMEN

## 2020-08-15 ASSESSMENT — PAIN DESCRIPTION - DESCRIPTORS
DESCRIPTORS: CONSTANT
DESCRIPTORS: CONSTANT

## 2020-08-15 NOTE — PLAN OF CARE
Problem: Pain:  Description: Pain management should include both nonpharmacologic and pharmacologic interventions. Goal: Pain level will decrease  Description: Pain level will decrease  Outcome: Ongoing  Goal: Control of acute pain  Description: Control of acute pain  Outcome: Ongoing  Goal: Control of chronic pain  Description: Control of chronic pain  Outcome: Ongoing     Problem: Falls - Risk of:  Goal: Will remain free from falls  Description: Will remain free from falls  Outcome: Ongoing  Goal: Absence of physical injury  Description: Absence of physical injury  Outcome: Ongoing     Problem: Activity:  Goal: Risk for activity intolerance will decrease  Description: Risk for activity intolerance will decrease  Outcome: Ongoing     Problem:  Bowel/Gastric:  Goal: Bowel function will improve  Description: Bowel function will improve  Outcome: Ongoing  Goal: Diagnostic test results will improve  Description: Diagnostic test results will improve  Outcome: Ongoing  Goal: Occurrences of nausea will decrease  Description: Occurrences of nausea will decrease  Outcome: Ongoing  Goal: Occurrences of vomiting will decrease  Description: Occurrences of vomiting will decrease  Outcome: Ongoing     Problem: Fluid Volume:  Goal: Maintenance of adequate hydration will improve  Description: Maintenance of adequate hydration will improve  Outcome: Ongoing  Goal: Will maintain adequate fluid volume  Description: Will maintain adequate fluid volume  Outcome: Ongoing     Problem: Health Behavior:  Goal: Ability to state signs and symptoms to report to health care provider will improve  Description: Ability to state signs and symptoms to report to health care provider will improve  Outcome: Ongoing  Goal: Ability to identify changes in lifestyle to reduce recurrence of condition will improve  Description: Ability to identify changes in lifestyle to reduce recurrence of condition will improve  Outcome: Ongoing     Problem: Physical Regulation:  Goal: Complications related to the disease process, condition or treatment will be avoided or minimized  Description: Complications related to the disease process, condition or treatment will be avoided or minimized  Outcome: Ongoing  Goal: Ability to maintain clinical measurements within normal limits will improve  Description: Ability to maintain clinical measurements within normal limits will improve  Outcome: Ongoing  Goal: Hemodynamic stability will improve  Description: Hemodynamic stability will improve  Outcome: Ongoing     Problem: Sensory:  Goal: Pain level will decrease  Description: Pain level will decrease  Outcome: Ongoing  Goal: Ability to identify factors that increase the pain will improve  Description: Ability to identify factors that increase the pain will improve  Outcome: Ongoing  Goal: Ability to notify healthcare provider of pain before it becomes unmanageable or unbearable will improve  Description: Ability to notify healthcare provider of pain before it becomes unmanageable or unbearable will improve  Outcome: Ongoing  Goal: General experience of comfort will improve  Description: General experience of comfort will improve  Outcome: Ongoing     Problem: Coping:  Goal: Ability to verbalize feelings will improve  Description: Ability to verbalize feelings will improve  Outcome: Ongoing  Goal: Level of anxiety will decrease  Description: Level of anxiety will decrease  Outcome: Ongoing     Problem: Nutritional:  Goal: Ability to achieve adequate nutritional intake will improve  Description: Ability to achieve adequate nutritional intake will improve  Outcome: Ongoing     Problem: Skin Integrity:  Goal: Skin integrity will be maintained  Description: Skin integrity will be maintained  Outcome: Ongoing

## 2020-08-15 NOTE — CARE COORDINATION
Case Management Initial Discharge Plan  Neyda Hinton,         Readmission Risk              Risk of Unplanned Readmission:        16             Met with:patient to discuss discharge plans. Information verified: address, contacts, phone number, , insurance Yes  PCP: Cecy Pederson PA-C  Date of last visit: last month     Insurance Provider: Asif     Discharge Planning  Current Residence:  Private home   Living Arrangements:  Spouse/Significant Other        Home has 1 stories  no stairs to climb to enter the home. Support Systems:  Spouse/Significant Other       Current Services PTA:  None   Agency: none      Patient able to perform ADL's:Independent  DME in home:  None   DME used to aid ambulation prior to admission:   None   DME used during admission:  None     Potential Assistance Needed:  N/A    [facility] [place] [facility] [place]    Potential Assistance Purchasing Medications:  No  Does patient want to participate in local refill/ meds to beds program?  No    Patient agreeable to home care: No  [place] of choice provided:  n/a      Type of Home Care Services:  None  Patient expects to be discharged to:  home    Prior SNF/Rehab Placement and Facility: none   Agreeable to SNF/Rehab: No  [place] of choice provided: n/a   Evaluation: n/a    Expected Discharge date:  20  Follow Up Appointment: Best Day/ Time:  any    Transportation provider:  Per spouse  Transportation arrangements needed for discharge: No    Discharge Plan:   Patient lives at home with wife and very independent and an active . He has never used any DME in the past.     He is admitted with pancreatitis with h/o COPD. Per H&P has history of etoh abuse but denies during assessment. Declines any alcohol cessation resources at this time.  Will follow     Electronically signed by Monster Florez RN on 8/15/20 at 10:02 AM EDT

## 2020-08-15 NOTE — ED NOTES
Pt resting with eyes closed. Chest rising and falling appropriately. Will continue to monitor.       Vivek Roland RN  08/15/20 0025

## 2020-08-15 NOTE — H&P
[facility]    HISTORY AND PHYSICAL EXAMINATION            Date:   8/14/2020  Patient name:  Lakia Justin  Date of admission:  8/14/2020  2:33 PM  MRN:   [id number]  Account:  [de-identified]  YOB: 1955  PCP:    Tomi Blanton PA-C  Room:   [place] [address]  Code Status:    Full Code    Chief Complaint:     Chief Complaint   Patient presents with    Abdominal Pain     History Obtained From:     Patient and electronic medical record. History of Present Illness:     Lakia Justin is a 76 y.o. Non-/non  male who presents with Abdominal Pain   and is admitted to the hospital for the management of Acute pancreatitis. The patient reports to the hospital with complaint of abdominal pain. He states that he is experiencing pain to the middle of his abdomen that he describes as sharp, severe and constant. He reports that the pain started yesterday and has progressively worsened. He endorses nausea but no vomiting. He denies diarrhea fever or chills. His last bowel movement was a day and a half ago. No additional symptomology or modifying factors. He has past medical history that includes diabetes, hypertension, COPD. He endorses drinking 1/5 of vodka daily and states that his last alcoholic drink was 2 days ago. Blood glucose 234, ALT 42, AST 40, lipase 1200, WBC 13.2. CT abdomen pelvis with contrast in acute acute edematous interstitial pancreatitis. Peripancreatic inflammatory and edematous changes without discrete fluid collection. 2 adjacent globular moderately hyperdense pancreatic tail foci following attenuation of adjacent vessels measuring 1.5 cm and 2.0 cm. Appearance is concerning for a pseudoaneurysm with possible adjacent hematoma.   This would be very assessed with a multi phasic contrast enhanced exam.  A 5.7 cm left renal cyst.    Past Medical History:     Past Medical History:   Diagnosis Date    [facility] [facility]     [facility]     Edema     Heart attack ([facility]     History of diabetes mellitus, type II     Hypertension     Obesity         Past Surgical History:     Past Surgical History:   Procedure Laterality Date    HERNIA REPAIR      TONSILLECTOMY          Medications Prior to Admission:     Prior to Admission medications    Medication Sig Start Date End Date Taking? Authorizing Provider   METHADONE HCL PO Take 70 mg by mouth daily    Yes Historical Provider, MD   lisinopril (PRINIVIL;ZESTRIL) 40 MG tablet Take 1 tablet by mouth 11/23/19  Yes Historical Provider, MD   albuterol sulfate  (90 Base) MCG/ACT inhaler Inhale into the lungs   Yes Historical Provider, MD   carvedilol (COREG) 6.25 MG tablet Take 6.25 tablets by mouth 2 times daily  11/25/19  Yes Historical Provider, MD   metFORMIN (GLUCOPHAGE) 500 MG tablet Take 500 mg by mouth 2 times daily (with meals)   Yes Historical Provider, MD   furosemide (LASIX) 40 MG tablet Take 40 mg by mouth daily    Yes Historical Provider, MD        Allergies:     Patient has no known allergies. Social History:     Tobacco:    reports that he has quit smoking. He has never used smokeless tobacco.  Alcohol:      reports current alcohol use. Drug Use:  reports no history of drug use. Family History:     Family History   Problem Relation Age of Onset    No Known Problems Mother     Diabetes Father     Heart Disease Father     Heart Disease Paternal Grandfather        Review of Systems:     Positive and Negative as described in HPI. Review of Systems   Constitutional: Negative for chills, diaphoresis and fever. HENT: Negative for congestion. Eyes: Negative for visual disturbance. Respiratory: Negative for cough, chest tightness, shortness of breath and wheezing. Cardiovascular: Negative for chest pain, palpitations and leg swelling. Gastrointestinal: Positive for abdominal pain and nausea. Pulses: Normal pulses. Heart sounds: Normal heart sounds. No murmur. Pulmonary:      Effort: Pulmonary effort is normal. No respiratory distress. Breath sounds: Normal breath sounds. No stridor. No decreased breath sounds. Abdominal:      General: Bowel sounds are normal.      Palpations: Abdomen is soft. There is no mass. Tenderness: There is abdominal tenderness in the right upper quadrant. There is guarding. Musculoskeletal:         General: No tenderness. Skin:     General: Skin is warm and dry. Findings: No erythema, lesion or rash. Neurological:      Mental Status: He is alert and oriented to person, place, and time. He is not disoriented. Cranial Nerves: No cranial nerve deficit. Psychiatric:         Speech: Speech normal.         Behavior: Behavior normal. Behavior is cooperative.        Investigations:      Laboratory Testing:  Recent Results (from the past 24 hour(s))   EKG 12 Lead    Collection Time: 08/14/20  2:34 PM   Result Value Ref Range    Ventricular Rate 64 BPM    Atrial Rate 64 BPM    P-R Interval 186 ms    QRS Duration 146 ms    Q-T Interval 454 ms    QTc Calculation (Bazett) 468 ms    P Axis 51 degrees    R Axis 33 degrees    T Axis 28 degrees   CBC Auto Differential    Collection Time: 08/14/20  3:06 PM   Result Value Ref Range    WBC 13.2 (H) 3.5 - 11.3 k/uL    RBC 4.79 4.21 - 5.77 m/uL    Hemoglobin 15.3 13.0 - 17.0 g/dL    Hematocrit 45.7 40.7 - 50.3 %    MCV 95.4 82.6 - 102.9 fL    MCH 31.9 25.2 - 33.5 pg    MCHC 33.5 28.4 - 34.8 g/dL    RDW 12.5 11.8 - 14.4 %    Platelets 398 767 - 911 k/uL    MPV 9.9 8.1 - 13.5 fL    NRBC Automated 0.0 0.0 per 100 WBC    Differential Type NOT REPORTED     Seg Neutrophils 78 (H) 36 - 65 %    Lymphocytes 13 (L) 24 - 43 %    Monocytes 7 3 - 12 %    Eosinophils % 1 1 - 4 %    Basophils 0 0 - 2 %    Immature Granulocytes 1 (H) 0 %    Segs Absolute 10.38 (H) 1.50 - 8.10 k/uL    Absolute Lymph # 1.71 1.10 - 3.70 k/uL Absolute Mono # 0.90 0.10 - 1.20 k/uL    Absolute Eos # 0.12 0.00 - 0.44 k/uL    Basophils Absolute 0.04 0.00 - 0.20 k/uL    Absolute Immature Granulocyte 0.09 0.00 - 0.30 k/uL    WBC Morphology NOT REPORTED     RBC Morphology NOT REPORTED     Platelet Estimate NOT REPORTED    Basic Metabolic Panel w/ Reflex to MG    Collection Time: 08/14/20  3:06 PM   Result Value Ref Range    Glucose 234 (H) 70 - 99 mg/dL    BUN 12 8 - 23 mg/dL    CREATININE 0.89 0.70 - 1.20 mg/dL    Bun/Cre Ratio 13 9 - 20    Calcium 9.5 8.6 - 10.4 mg/dL    Sodium 134 (L) 135 - 144 mmol/L    Potassium 3.9 3.7 - 5.3 mmol/L    Chloride 95 (L) 98 - 107 mmol/L    CO2 25 20 - 31 mmol/L    Anion Gap 14 9 - 17 mmol/L    GFR Non-African American >60 >60 mL/min    GFR African American >60 >60 mL/min    GFR Comment          GFR Staging NOT REPORTED    Hepatic Function Panel    Collection Time: 08/14/20  3:06 PM   Result Value Ref Range    Alb 3.5 3.5 - 5.2 g/dL    Alkaline Phosphatase 103 40 - 129 U/L    ALT 42 (H) 5 - 41 U/L    AST 40 (H) <40 U/L    Total Bilirubin 0.52 0.3 - 1.2 mg/dL    Bilirubin, Direct 0.16 <0.31 mg/dL    Bilirubin, Indirect 0.36 0.00 - 1.00 mg/dL    Total Protein 7.0 6.4 - 8.3 g/dL    Globulin NOT REPORTED 1.5 - 3.8 g/dL    Albumin/Globulin Ratio NOT REPORTED 1.0 - 2.5   Lipase    Collection Time: 08/14/20  3:06 PM   Result Value Ref Range    Lipase 1,200 (H) 13 - 60 U/L   Troponin    Collection Time: 08/14/20  3:06 PM   Result Value Ref Range    Troponin, High Sensitivity 13 0 - 22 ng/L    Troponin T NOT REPORTED <0.03 ng/mL    Troponin Interp NOT REPORTED    Ethanol    Collection Time: 08/14/20  3:06 PM   Result Value Ref Range    Ethanol <10 <10 mg/dL    Ethanol percent <0.010 <0.010 %       Imaging/Diagnostics:  Ct Abdomen Pelvis W Iv Contrast Additional Contrast? None    Result Date: 8/14/2020  1. Acute edematous interstitial pancreatitis. Peripancreatic inflammatory and edematous changes without discrete fluid collection.  2. Two adjacent globular moderately hyperdense pancreatic tail foci following attenuation of adjacent vessels measuring 1.5 cm and 2.0 cm. Appearance is concerning for a pseudoaneurysm with possible adjacent hematoma. This would be better assessed with a multiphasic contrast enhanced exam. 3. A 5.7 cm left renal cyst.     Assessment :      Hospital Problems           Last Modified POA    * (Principal) Acute pancreatitis 8/14/2020 Yes    COPD (chronic obstructive pulmonary disease) ([facility] 8/14/2020 Yes    Essential hypertension 8/14/2020 Yes    Type 2 diabetes mellitus, without long-term current use of insulin ([facility] [address] [facility] 8/14/2020 Yes    Alcohol abuse 8/14/2020 Yes        Plan:     Patient status inpatient in the  Med/Surge unit. 1. Consult GI- pancreatitis, pseudoaneurysm. 2. IV pepcid. 3. Antiemetics. 4. IV hydration. 5. Pain control. 6. Medication list includes methadone, hold while receiving narcotic pain medication. 7. DM- hold metformin, received IV contrast with CT. Medium dose insulin sliding scale. 8. COPD- supplemental oxygen as needed. 9. HTN- patient is hypertensive. Resume home lisinopril and carvedilol. x1 dose 10mg IV hydralazine. 10. ETOH abuse- thiamine and folate supplementation, CIWA scale. 11. Monitor vital signs. 12. Follow chemistries, check hbA1C.  13. NPO for now. 14. Activity as tolerated with assist.    Plan of care discussed with patient, Praveen DELA CRUZ RN and Imtiaz Merida med/surge RN. Consultations:   IP CONSULT TO INTERNAL MEDICINE  IP CONSULT TO SOCIAL WORK  IP CONSULT TO GI    Patient is admitted as inpatient status because of co-morbidities listed above, severity of signs and symptoms as outlined, requirement for current medical therapies and most importantly because of direct risk to patient if care not provided in a hospital setting. Expected length of stay > 48 hours.     KATELYN Blanchard CNP  8/14/2020  10:58 PM    Copy sent to Dr. Ross Franklin PA-C (Please note that portions of this note were completed with a voice recognition program. Efforts were made to edit the dictations but occasionally words are mis-transcribed.)

## 2020-08-15 NOTE — CONSULTS
GI Consult Note:    Name: Vasile Fields  MRN: [id number]     Acct: [de-identified]  Room: 2003/2003-02    Admit Date: 8/14/2020  PCP: Beatrice Villela PA-C    Physician Requesting Consult: Kalani Maurice DO     Reason for Consult: Acute pancreatitis  Heavy alcohol consumption  Morbid obesity  Abdominal pains  Nausea  Abnormal imaging      Chief Complaint:     Chief Complaint   Patient presents with    Abdominal Pain       History Obtained From:     Patient and EMR    History of Present Illness:      Vasile Fields is a  76 y.o.  male who presents with Abdominal Pain    This patient has history significant multiple chronic medical issues  Has history for obesity  Apparently he was drinking hard liquor rather heavy he claims  He does have history for some drinking  But she denies any previous history for pancreatitis like this  No known history for cirrhosis  Patient started getting rather severe constant abdominal pain in the upper stomach area with some radiation to the back  Had mild nausea associated with that  He was brought to the emergency room with his pancreatic enzymes were found to be elevated  Patient had a CT scan of the abdomen done which has revealed the following findings  CT abdomen pelvis with contrast in acute acute edematous interstitial pancreatitis. Peripancreatic inflammatory and edematous changes without discrete fluid collection. 2 adjacent globular moderately hyperdense pancreatic tail foci following attenuation of adjacent vessels measuring 1.5 cm and 2.0 cm. Appearance is concerning for a pseudoaneurysm with possible adjacent hematoma.   This would be very assessed with a multi phasic contrast enhanced exam.  A 5.7 cm left renal cyst.  Currently clinically feeling little bit better but still not able to pass any gas or had bowel movement  Patient denies any hematemesis or rectal bleeding melanotic stools does have nausea  Appears to have some anxiety issues  Symptoms:  Onset:  Location:  abdomen  Duration:  day(s)  Severity:  moderate, severe  Quality:  constant      Past Medical History:     Past Medical History:   Diagnosis Date    COPD [facility]chronic obstructive [facility] disease) ([facility]     Diabetes mellitus ([facility]     Edema     Heart attack ([facility]     History of diabetes mellitus, type II     Hypertension     Obesity         Past Surgical History:     Past Surgical History:   Procedure Laterality Date    HERNIA REPAIR      TONSILLECTOMY          Medications Prior to Admission:       Prior to Admission medications    Medication Sig Start Date End Date Taking? Authorizing Provider   METHADONE HCL PO Take 70 mg by mouth daily    Yes Historical Provider, MD   lisinopril (PRINIVIL;ZESTRIL) 40 MG tablet Take 1 tablet by mouth 11/23/19  Yes Historical Provider, MD   albuterol sulfate  (90 Base) MCG/ACT inhaler Inhale into the lungs   Yes Historical Provider, MD   carvedilol (COREG) 6.25 MG tablet Take 6.25 tablets by mouth 2 times daily  11/25/19  Yes Historical Provider, MD   metFORMIN (GLUCOPHAGE) 500 MG tablet Take 500 mg by mouth 2 times daily (with meals)   Yes Historical Provider, MD   furosemide (LASIX) 40 MG tablet Take 40 mg by mouth daily    Yes Historical Provider, MD        Allergies:       Patient has no known allergies. Social History:     Tobacco:    reports that he has quit smoking. He has never used smokeless tobacco.  Alcohol:      reports current alcohol use. Drug Use:  reports no history of drug use.     Family History:     Family History   Problem Relation Age of Onset    No Known Problems Mother     Diabetes Father     Heart Disease Father     Heart Disease Paternal Grandfather        Review of Systems:     Positive and Negative as described in HPI    Constitutional:  negative for  fevers, chills, sweats, mild fatigue, and weight loss  HEENT:  negative for vision or hearing changes,   Respiratory:  negative for shortness of breath, cough, or congestion  Cardiovascular:  negative for  chest pain, palpitations  Gastrointestinal: Positive nausea, vomiting, diarrhea, constipation, positive abdominal pain  Genitourinary:  negative for frequency, dysuria  Integument:  negative for rash, skin lesions  Musculoskeletal:  negative for muscle aches or joint pain  Neurological:  negative for headaches, dizziness, lightheadedness, numbness, pain and tingling extrimities  Behavior/Psych:  negative for depression and positive anxiety    Code Status:  Full Code    Physical Exam:     Vitals:  BP (!) 160/75   Pulse 92   Temp 98.6 °F (37 °C) (Oral)   Resp 18   Ht 5' 11\" (1.803 m)   Wt 272 lb 14.4 oz (123.8 kg)   SpO2 92%   BMI 38.06 kg/m²   Temp (24hrs), Av.7 °F (37.1 °C), Min:97.9 °F (36.6 °C), Max:99.5 °F (37.5 °C)      General appearance - alert, obesity sick appearing, and in no acute distress  Mental status - oriented to person, place, and time with anxious affect  Head - normocephalic and atraumatic  Eyes - pupils equal and reactive, extraocular eye movements intact, conjunctiva clear  Ears - hearing appears to be intact  Nose - no drainage noted  Mouth - mucous membranes moist  Neck - supple, no carotid bruits, thyroid not palpable  Chest - clear to auscultation, normal effort  Heart - normal rate, regular rhythm, no murmurs  Abdomen - soft, bloated diffusely positive for tenderness, nondistended, bowel sounds hypoactive all four quadrants, no masses, hepatomegaly or splenomegaly.  No hernias  Neurological - normal speech, no focal findings or movement disorder noted, cranial nerves II through XII grossly intact  Extremities -  no pedal edema or calf pain with palpation  Skin - no gross lesions, rashes, or induration noted  Cranial Nerves : grossly intact  Lymph nodes: not done at this time    Data:   CBC:   Lab Results   Component Value Date    WBC 17.9 08/15/2020    RBC 4.98 08/15/2020    HGB 15.7 08/15/2020    HCT 48.2 08/15/2020 MCV 96.8 08/15/2020    MCH 31.5 08/15/2020    MCHC 32.6 08/15/2020    RDW 13.0 08/15/2020     08/15/2020    MPV 9.9 08/15/2020     CBC with Differential:    Lab Results   Component Value Date    WBC 17.9 08/15/2020    RBC 4.98 08/15/2020    HGB 15.7 08/15/2020    HCT 48.2 08/15/2020     08/15/2020    MCV 96.8 08/15/2020    MCH 31.5 08/15/2020    MCHC 32.6 08/15/2020    RDW 13.0 08/15/2020    LYMPHOPCT 13 08/14/2020    MONOPCT 7 08/14/2020    BASOPCT 0 08/14/2020    MONOSABS 0.90 08/14/2020    LYMPHSABS 1.71 08/14/2020    EOSABS 0.12 08/14/2020    BASOSABS 0.04 08/14/2020    DIFFTYPE NOT REPORTED 08/14/2020     Hemoglobin/Hematocrit:    Lab Results   Component Value Date    HGB 15.7 08/15/2020    HCT 48.2 08/15/2020     CMP:    Lab Results   Component Value Date     08/15/2020    K 4.2 08/15/2020    CL 98 08/15/2020    CO2 27 08/15/2020    BUN 10 08/15/2020    CREATININE 0.93 08/15/2020    GFRAA >60 08/15/2020    LABGLOM >60 08/15/2020    GLUCOSE 255 08/15/2020    PROT 6.8 08/15/2020    LABALBU 3.4 08/15/2020    CALCIUM 8.2 08/15/2020    BILITOT 0.48 08/15/2020    ALKPHOS 111 08/15/2020    AST 38 08/15/2020    ALT 35 08/15/2020     BMP:    Lab Results   Component Value Date     08/15/2020    K 4.2 08/15/2020    CL 98 08/15/2020    CO2 27 08/15/2020    BUN 10 08/15/2020    LABALBU 3.4 08/15/2020    CREATININE 0.93 08/15/2020    CALCIUM 8.2 08/15/2020    GFRAA >60 08/15/2020    LABGLOM >60 08/15/2020    GLUCOSE 255 08/15/2020     PT/INR:    Lab Results   Component Value Date    PROTIME 13.1 08/15/2020    INR 1.0 08/15/2020     PTT:  No results found for: APTT, PTT[APTT}    Assesment:     Primary Problem  Acute pancreatitis    Active Hospital Problems    Diagnosis Date Noted    Acute pancreatitis [K85.90]     Alcohol abuse [F10.10]     Essential hypertension [I10]     [facility] [J44.9]     Type 2 diabetes mellitus, without long-term current use of insulin (HCC) [E11.9]       Acute pancreatitis  Heavy alcohol consumption  Morbid obesity  Abdominal pains  Nausea  Abnormal imaging    Plan:     1. Keep n.p.o.  2. Aggressive hydration  3. Analgesia  4. Intake and output  5. Follow-up amylase lipase pancreatic enzyme liver enzymes  6. Once he starts passing gas we will start clear liquid diet on him  7. Reevaluate in the morning  8. We will need repeat imaging for abnormal imaging of the tail of the pancreas possible MRCP if positive may also need EUS later  9. Patient is advised to stay away from alcohol completely  10. Watch for any DTs  11. Explained to the patient discussed with nursing staff on the floor reevaluate in the morning        Thank you for allowing me to participate in the care of your patient. Please feel free to contact me with any questions or concerns.      Electronically signed by Juliana Crisostomo MD on 8/15/2020 at 7:41 PM     Copy sent to Dr. Oly Orellana PA-C

## 2020-08-15 NOTE — FLOWSHEET NOTE
Patient states well. Expresses no major needs or worries. Declines visit at this time.      08/15/20 1558   Encounter Summary   Services provided to: Patient   Referral/Consult From: Rounding   Continue Visiting   (8-15-20)   Complexity of Encounter Low   Length of Encounter 15 minutes   Spiritual Assessment Completed Yes   Routine   Type Initial   Assessment Passive   Intervention Explored feelings, thoughts, concerns   Outcome Refused/declined

## 2020-08-15 NOTE — ED NOTES
Pt using the call light. States that he is feeling like he is going to vomit and asking for a basin. Will wait to give medication that is due until pt's stomach settles.       Jb Holloway RN  08/14/20 1614

## 2020-08-15 NOTE — PROGRESS NOTES
[facility]    Progress Note    8/15/2020    2:22 PM    Name:   Justin Jackson  MRN:     [id number]     Acct:      [de-identified]   Room:   2003/2003-02  IP Day:  1  Admit Date:  8/14/2020  2:33 PM    PCP:   Julián Finley PA-C, PA-C  Code Status:  Full Code    Subjective:     C/C:   Chief Complaint   Patient presents with    Abdominal Pain     Interval History Status: improved. Continues to have epigastric pain, inquired about his methadone. Still feels nauseous at this time and unable to eat. States this is his first incident, he states he has been heavily drinking about a pint a day until start his abdominal pain 1 day ago. Discussed alcohol cessation. Brief History:     Justin aJckson is a 76 y.o. Non-/non  male who presents with Abdominal Pain   and is admitted to the hospital for the management of Acute pancreatitis.     The patient reports to the hospital with complaint of abdominal pain. He states that he is experiencing pain to the middle of his abdomen that he describes as sharp, severe and constant. He reports that the pain started yesterday and has progressively worsened. He endorses nausea but no vomiting. He denies diarrhea fever or chills. His last bowel movement was a day and a half ago. No additional symptomology or modifying factors. He has past medical history that includes diabetes, hypertension, COPD. He endorses drinking 1/5 of vodka daily and states that his last alcoholic drink was 2 days ago.     Blood glucose 234, ALT 42, AST 40, lipase 1200, WBC 13. 2.     CT abdomen pelvis with contrast in acute acute edematous interstitial pancreatitis. Peripancreatic inflammatory and edematous changes without discrete fluid collection. 2 adjacent globular moderately hyperdense pancreatic tail foci following attenuation of adjacent vessels measuring 1.5 cm and 2.0 cm.   Appearance is concerning for a pseudoaneurysm with possible adjacent hematoma. This would be very assessed with a multi phasic contrast enhanced exam.  A 5.7 cm left renal cyst.    Review of Systems:     Constitutional:  negative for chills, fevers, sweats  Respiratory:  negative for cough, dyspnea on exertion, shortness of breath, wheezing  Cardiovascular:  negative for chest pain, chest pressure/discomfort, lower extremity edema, palpitations  Gastrointestinal:  Complains of epigastric pain and nausea   Neurological:  negative for dizziness, headache    Medications: Allergies:  No Known Allergies    Current Meds:   Scheduled Meds:    sodium chloride flush  10 mL Intravenous 2 times per day    insulin lispro  0-12 Units Subcutaneous TID WC    insulin lispro  0-6 Units Subcutaneous Nightly    methadone  70 mg Oral Daily    lisinopril  40 mg Oral Daily    famotidine (PEPCID) injection  20 mg Intravenous BID    enoxaparin  30 mg Subcutaneous BID    multivitamin  1 tablet Oral Daily    carvedilol  6.25 mg Oral BID     Continuous Infusions:    dextrose      lactated ringers 200 mL/hr at 08/15/20 0936     PRN Meds: sodium chloride flush, glucose, dextrose, glucagon (rDNA), dextrose, potassium chloride, magnesium sulfate, acetaminophen, HYDROcodone 5 mg - acetaminophen **OR** HYDROcodone 5 mg - acetaminophen, HYDROmorphone **OR** HYDROmorphone, promethazine **OR** ondansetron, LORazepam **OR** LORazepam **OR** LORazepam **OR** LORazepam **OR** LORazepam **OR** LORazepam **OR** LORazepam **OR** LORazepam    Data:     Past Medical History:   has a past medical history of COPD (chronic obstructive pulmonary disease) ([facility] Diabetes mellitus ([facility] Edema, Heart attack ([facility] History of diabetes mellitus, type II, Hypertension, and Obesity. Social History:   reports that he has quit smoking. He has never used smokeless tobacco. He reports current alcohol use. He reports that he does not use drugs.      Family History:   Family History   Problem Relation Age of Onset    No Known Problems Mother     Diabetes Father     Heart Disease Father     Heart Disease Paternal Grandfather        Vitals:  BP (!) 177/78   Pulse 76   Temp 99.5 °F (37.5 °C) (Oral)   Resp 18   Ht 5' 11\" (1.803 m)   Wt 272 lb 14.4 oz (123.8 kg)   SpO2 90%   BMI 38.06 kg/m²   Temp (24hrs), Av.3 °F (36.8 °C), Min:97.8 °F (36.6 °C), Max:99.5 °F (37.5 °C)    Recent Labs     08/15/20  0101 08/15/20  0630 08/15/20  1131   POCGLU 273* 232* 237*       I/O (24Hr):     Intake/Output Summary (Last 24 hours) at 8/15/2020 1422  Last data filed at 8/15/2020 7820  Gross per 24 hour   Intake 1652.99 ml   Output --   Net 1652.99 ml       Labs:  Hematology:  Recent Labs     20  1506 08/15/20  0605   WBC 13.2* 17.9*   RBC 4.79 4.98   HGB 15.3 15.7   HCT 45.7 48.2   MCV 95.4 96.8   MCH 31.9 31.5   MCHC 33.5 32.6   RDW 12.5 13.0    215   MPV 9.9 9.9   INR  --  1.0     Chemistry:  Recent Labs     20  1506 08/15/20  0605   * 137   K 3.9 4.2   CL 95* 98   CO2 25 27   GLUCOSE 234* 255*   BUN 12 10   CREATININE 0.89 0.93   MG  --  2.3   ANIONGAP 14 12   LABGLOM >60 >60   GFRAA >60 >60   CALCIUM 9.5 8.2*   CAION  --  1.15   PHOS  --  2.4*   TROPHS 13  --      Recent Labs     20  1506 08/15/20  0101 08/15/20  0605 08/15/20  0630 08/15/20  1131   PROT 7.0  --  6.8  --   --    LABALBU 3.5  --  3.4*  --   --    TSH  --   --  0.96  --   --    AST 40*  --  38  --   --    ALT 42*  --  35  --   --    ALKPHOS 103  --  111  --   --    BILITOT 0.52  --  0.48  --   --    BILIDIR 0.16  --   --   --   --    AMYLASE  --   --  249*  --   --    LIPASE 1,200*  --  581*  --   --    TRIG  --   --  225*  --   --    POCGLU  --  273*  --  232* 237*     ABG:No results found for: POCPH, PHART, PH, POCPCO2, LTP4NXJ, PCO2, POCPO2, PO2ART, PO2, POCHCO3, MXL0TSG, HCO3, NBEA, PBEA, BEART, BE, THGBART, THB, EKG7YIB, BVAZ2FWW, Q3MEACQK, O2SAT, FIO2  Lab Results   Component Value Date/Time    SPECIAL LT ARM 8ML

## 2020-08-15 NOTE — ED NOTES
This writer noticed that patient was actively trying to make himself vomit. Will continue to hold off on giving him any oral medications at this time.       Vivek Roland RN  08/14/20 6887

## 2020-08-16 ENCOUNTER — APPOINTMENT (OUTPATIENT)
Dept: GENERAL RADIOLOGY | Age: 65
DRG: 439 | End: 2020-08-16
Payer: MEDICARE

## 2020-08-16 LAB
AMYLASE: 36 U/L (ref 28–100)
EKG ATRIAL RATE: 64 BPM
EKG P AXIS: 51 DEGREES
EKG P-R INTERVAL: 186 MS
EKG Q-T INTERVAL: 454 MS
EKG QRS DURATION: 146 MS
EKG QTC CALCULATION (BAZETT): 468 MS
EKG R AXIS: 33 DEGREES
EKG T AXIS: 28 DEGREES
EKG VENTRICULAR RATE: 64 BPM
GLUCOSE BLD-MCNC: 212 MG/DL (ref 75–110)
GLUCOSE BLD-MCNC: 217 MG/DL (ref 75–110)
GLUCOSE BLD-MCNC: 236 MG/DL (ref 75–110)
GLUCOSE BLD-MCNC: 348 MG/DL (ref 75–110)
LIPASE: 57 U/L (ref 13–60)

## 2020-08-16 PROCEDURE — 93010 ELECTROCARDIOGRAM REPORT: CPT | Performed by: INTERNAL MEDICINE

## 2020-08-16 PROCEDURE — 99231 SBSQ HOSP IP/OBS SF/LOW 25: CPT | Performed by: INTERNAL MEDICINE

## 2020-08-16 PROCEDURE — 2580000003 HC RX 258: Performed by: INTERNAL MEDICINE

## 2020-08-16 PROCEDURE — 2500000003 HC RX 250 WO HCPCS: Performed by: NURSE PRACTITIONER

## 2020-08-16 PROCEDURE — 6360000002 HC RX W HCPCS: Performed by: NURSE PRACTITIONER

## 2020-08-16 PROCEDURE — 83690 ASSAY OF LIPASE: CPT

## 2020-08-16 PROCEDURE — 36415 COLL VENOUS BLD VENIPUNCTURE: CPT

## 2020-08-16 PROCEDURE — 74018 RADEX ABDOMEN 1 VIEW: CPT

## 2020-08-16 PROCEDURE — 82947 ASSAY GLUCOSE BLOOD QUANT: CPT

## 2020-08-16 PROCEDURE — 99232 SBSQ HOSP IP/OBS MODERATE 35: CPT | Performed by: INTERNAL MEDICINE

## 2020-08-16 PROCEDURE — 82150 ASSAY OF AMYLASE: CPT

## 2020-08-16 PROCEDURE — 6370000000 HC RX 637 (ALT 250 FOR IP): Performed by: NURSE PRACTITIONER

## 2020-08-16 PROCEDURE — 2580000003 HC RX 258: Performed by: NURSE PRACTITIONER

## 2020-08-16 PROCEDURE — 6370000000 HC RX 637 (ALT 250 FOR IP): Performed by: INTERNAL MEDICINE

## 2020-08-16 PROCEDURE — 2580000003 HC RX 258: Performed by: SURGERY

## 2020-08-16 PROCEDURE — 1200000000 HC SEMI PRIVATE

## 2020-08-16 PROCEDURE — 6360000002 HC RX W HCPCS: Performed by: INTERNAL MEDICINE

## 2020-08-16 RX ORDER — MORPHINE SULFATE 2 MG/ML
2 INJECTION, SOLUTION INTRAMUSCULAR; INTRAVENOUS
Status: DISCONTINUED | OUTPATIENT
Start: 2020-08-16 | End: 2020-08-19 | Stop reason: HOSPADM

## 2020-08-16 RX ORDER — HYDRALAZINE HYDROCHLORIDE 20 MG/ML
5 INJECTION INTRAMUSCULAR; INTRAVENOUS EVERY 6 HOURS PRN
Status: DISCONTINUED | OUTPATIENT
Start: 2020-08-16 | End: 2020-08-19 | Stop reason: HOSPADM

## 2020-08-16 RX ORDER — METOPROLOL TARTRATE 5 MG/5ML
5 INJECTION INTRAVENOUS EVERY 4 HOURS PRN
Status: DISCONTINUED | OUTPATIENT
Start: 2020-08-16 | End: 2020-08-16

## 2020-08-16 RX ORDER — HYDRALAZINE HYDROCHLORIDE 20 MG/ML
5 INJECTION INTRAMUSCULAR; INTRAVENOUS ONCE
Status: COMPLETED | OUTPATIENT
Start: 2020-08-16 | End: 2020-08-16

## 2020-08-16 RX ORDER — ALBUTEROL SULFATE 90 UG/1
2 AEROSOL, METERED RESPIRATORY (INHALATION) EVERY 6 HOURS PRN
Status: DISCONTINUED | OUTPATIENT
Start: 2020-08-16 | End: 2020-08-19 | Stop reason: HOSPADM

## 2020-08-16 RX ORDER — SODIUM CHLORIDE, SODIUM LACTATE, POTASSIUM CHLORIDE, AND CALCIUM CHLORIDE .6; .31; .03; .02 G/100ML; G/100ML; G/100ML; G/100ML
500 INJECTION, SOLUTION INTRAVENOUS ONCE
Status: COMPLETED | OUTPATIENT
Start: 2020-08-16 | End: 2020-08-16

## 2020-08-16 RX ORDER — BISACODYL 10 MG
10 SUPPOSITORY, RECTAL RECTAL DAILY
Status: DISCONTINUED | OUTPATIENT
Start: 2020-08-16 | End: 2020-08-19 | Stop reason: HOSPADM

## 2020-08-16 RX ORDER — MORPHINE SULFATE 4 MG/ML
4 INJECTION, SOLUTION INTRAMUSCULAR; INTRAVENOUS
Status: DISCONTINUED | OUTPATIENT
Start: 2020-08-16 | End: 2020-08-19 | Stop reason: HOSPADM

## 2020-08-16 RX ORDER — POLYETHYLENE GLYCOL 3350 17 G/17G
17 POWDER, FOR SOLUTION ORAL DAILY
Status: DISCONTINUED | OUTPATIENT
Start: 2020-08-16 | End: 2020-08-19 | Stop reason: HOSPADM

## 2020-08-16 RX ORDER — SODIUM PHOSPHATE, DIBASIC AND SODIUM PHOSPHATE, MONOBASIC 7; 19 G/133ML; G/133ML
1 ENEMA RECTAL
Status: ACTIVE | OUTPATIENT
Start: 2020-08-16 | End: 2020-08-16

## 2020-08-16 RX ADMIN — CARVEDILOL 6.25 MG: 3.12 TABLET, FILM COATED ORAL at 10:17

## 2020-08-16 RX ADMIN — INSULIN LISPRO 4 UNITS: 100 INJECTION, SOLUTION INTRAVENOUS; SUBCUTANEOUS at 17:53

## 2020-08-16 RX ADMIN — HYDRALAZINE HYDROCHLORIDE 5 MG: 20 INJECTION INTRAMUSCULAR; INTRAVENOUS at 10:19

## 2020-08-16 RX ADMIN — CARVEDILOL 6.25 MG: 3.12 TABLET, FILM COATED ORAL at 20:21

## 2020-08-16 RX ADMIN — INSULIN LISPRO 2 UNITS: 100 INJECTION, SOLUTION INTRAVENOUS; SUBCUTANEOUS at 21:02

## 2020-08-16 RX ADMIN — INSULIN LISPRO 8 UNITS: 100 INJECTION, SOLUTION INTRAVENOUS; SUBCUTANEOUS at 13:02

## 2020-08-16 RX ADMIN — METFORMIN HYDROCHLORIDE 500 MG: 500 TABLET ORAL at 17:52

## 2020-08-16 RX ADMIN — FAMOTIDINE 20 MG: 10 INJECTION, SOLUTION INTRAVENOUS at 10:18

## 2020-08-16 RX ADMIN — METOPROLOL TARTRATE 5 MG: 5 INJECTION INTRAVENOUS at 04:05

## 2020-08-16 RX ADMIN — FAMOTIDINE 20 MG: 10 INJECTION, SOLUTION INTRAVENOUS at 20:21

## 2020-08-16 RX ADMIN — MULTIVITAMIN TABLET 1 TABLET: TABLET at 10:17

## 2020-08-16 RX ADMIN — LISINOPRIL 40 MG: 40 TABLET ORAL at 10:19

## 2020-08-16 RX ADMIN — METHADONE HYDROCHLORIDE 70 MG: 10 TABLET ORAL at 10:16

## 2020-08-16 RX ADMIN — SODIUM CHLORIDE, POTASSIUM CHLORIDE, SODIUM LACTATE AND CALCIUM CHLORIDE: 600; 310; 30; 20 INJECTION, SOLUTION INTRAVENOUS at 17:56

## 2020-08-16 RX ADMIN — SODIUM CHLORIDE, POTASSIUM CHLORIDE, SODIUM LACTATE AND CALCIUM CHLORIDE: 600; 310; 30; 20 INJECTION, SOLUTION INTRAVENOUS at 22:26

## 2020-08-16 RX ADMIN — SODIUM CHLORIDE, POTASSIUM CHLORIDE, SODIUM LACTATE AND CALCIUM CHLORIDE: 600; 310; 30; 20 INJECTION, SOLUTION INTRAVENOUS at 02:10

## 2020-08-16 RX ADMIN — Medication 10 ML: at 10:19

## 2020-08-16 RX ADMIN — ENOXAPARIN SODIUM 30 MG: 30 INJECTION SUBCUTANEOUS at 20:21

## 2020-08-16 RX ADMIN — BISACODYL 10 MG: 10 SUPPOSITORY RECTAL at 10:18

## 2020-08-16 RX ADMIN — MORPHINE SULFATE 4 MG: 4 INJECTION, SOLUTION INTRAMUSCULAR; INTRAVENOUS at 22:24

## 2020-08-16 RX ADMIN — SODIUM CHLORIDE, POTASSIUM CHLORIDE, SODIUM LACTATE AND CALCIUM CHLORIDE 500 ML: 600; 310; 30; 20 INJECTION, SOLUTION INTRAVENOUS at 13:51

## 2020-08-16 RX ADMIN — METFORMIN HYDROCHLORIDE 500 MG: 500 TABLET ORAL at 10:17

## 2020-08-16 ASSESSMENT — PAIN DESCRIPTION - FREQUENCY: FREQUENCY: INTERMITTENT

## 2020-08-16 ASSESSMENT — PAIN DESCRIPTION - PROGRESSION

## 2020-08-16 ASSESSMENT — PAIN SCALES - GENERAL
PAINLEVEL_OUTOF10: 10

## 2020-08-16 ASSESSMENT — PAIN DESCRIPTION - DESCRIPTORS
DESCRIPTORS: CONSTANT
DESCRIPTORS: CRAMPING
DESCRIPTORS: CONSTANT

## 2020-08-16 ASSESSMENT — PAIN DESCRIPTION - PAIN TYPE
TYPE: ACUTE PAIN

## 2020-08-16 ASSESSMENT — PAIN DESCRIPTION - LOCATION
LOCATION: ABDOMEN

## 2020-08-16 ASSESSMENT — PAIN DESCRIPTION - ONSET: ONSET: ON-GOING

## 2020-08-16 NOTE — PROGRESS NOTES
GI Progress notes    8/16/2020   4:26 PM    Name:  Ke Haro  MRN:    [id number]     Acct:     [de-identified]   Room:  2003/2003-02  IP Day: 2     Admit Date: 8/14/2020  2:33 PM  PCP: Davin Coello PA-C, KASSIE    Subjective:     C/C:   Chief Complaint   Patient presents with    Abdominal Pain       Interval History: Status: improved. Lipase is normal now  Still having abdominal bloating not able to pass any gas  KUB showing adynamic ileus  Has been started on clear by primary care? Patient is very drowsy because of narcotics? No bleeding melena hematemesis reported      ROS:  Constitutional: negative for chills, fevers and sweats    Gastrointestinal: Positive abdominal pain, constipation, diarrhea, nausea and vomiting      Medications:      Allergies: No Known Allergies    Current Meds: albuterol sulfate  (90 Base) MCG/ACT inhaler 2 puff, Q6H PRN  polyethylene glycol (GLYCOLAX) packet 17 g, Daily  bisacodyl (DULCOLAX) suppository 10 mg, Daily  fleet rectal enema 1 enema, Once PRN  metFORMIN (GLUCOPHAGE) tablet 500 mg, BID WC  hydrALAZINE (APRESOLINE) injection 5 mg, Q6H PRN  sodium chloride flush 0.9 % injection 10 mL, 2 times per day  sodium chloride flush 0.9 % injection 10 mL, PRN  glucose (GLUTOSE) 40 % oral gel 15 g, PRN  dextrose 50 % IV solution, PRN  glucagon (rDNA) injection 1 mg, PRN  dextrose 5 % solution, PRN  insulin lispro (HUMALOG) injection vial 0-12 Units, TID WC  insulin lispro (HUMALOG) injection vial 0-6 Units, Nightly  methadone (DOLOPHINE) tablet 70 mg, Daily  lactated ringers infusion, Continuous  lisinopril (PRINIVIL;ZESTRIL) tablet 40 mg, Daily  potassium chloride 10 mEq/100 mL IVPB (Peripheral Line), PRN  magnesium sulfate 1 g in dextrose 5% 100 mL IVPB, PRN  acetaminophen (TYLENOL) tablet 650 mg, Q4H PRN  HYDROcodone-acetaminophen (NORCO) 5-325 MG per tablet 1 tablet, Q4H PRN    Or  HYDROcodone-acetaminophen (NORCO) 5-325 MG per tablet 2 tablet, Q4H PRN  HYDROmorphone HCl file     Attends [employer] service: Not on file     Active member of club or organization: Not on file     Attends meetings of clubs or organizations: Not on file     Relationship status: Not on file    Intimate partner violence     Fear of current or ex partner: Not on file     Emotionally abused: Not on file     Physically abused: Not on file     Forced sexual activity: Not on file   Other Topics Concern    Not on file   Social History Narrative    Not on file       Vitals:  BP (!) 142/75   Pulse 88   Temp 98.2 °F (36.8 °C) (Oral)   Resp 18   Ht 5' 11\" (1.803 m)   Wt 276 lb 4.8 oz (125.3 kg)   SpO2 93%   BMI 38.54 kg/m²   Temp (24hrs), Av.5 °F (36.9 °C), Min:97.7 °F (36.5 °C), Max:99.3 °F (37.4 °C)    Recent Labs     08/15/20  1619 08/15/20  2006 08/16/20  0617 20  1131   POCGLU 221* 216* 236* 348*       I/O (24Hr):     Intake/Output Summary (Last 24 hours) at 2020 1626  Last data filed at 2020 1451  Gross per 24 hour   Intake 1231 ml   Output 900 ml   Net 331 ml       Labs:      CBC:   Lab Results   Component Value Date    WBC 17.9 08/15/2020    RBC 4.98 08/15/2020    HGB 15.7 08/15/2020    HCT 48.2 08/15/2020    MCV 96.8 08/15/2020    MCH 31.5 08/15/2020    MCHC 32.6 08/15/2020    RDW 13.0 08/15/2020     08/15/2020    MPV 9.9 08/15/2020     CBC with Differential:    Lab Results   Component Value Date    WBC 17.9 08/15/2020    RBC 4.98 08/15/2020    HGB 15.7 08/15/2020    HCT 48.2 08/15/2020     08/15/2020    MCV 96.8 08/15/2020    MCH 31.5 08/15/2020    MCHC 32.6 08/15/2020    RDW 13.0 08/15/2020    LYMPHOPCT 13 2020    MONOPCT 7 2020    BASOPCT 0 2020    MONOSABS 0.90 2020    LYMPHSABS 1.71 2020    EOSABS 0.12 2020    BASOSABS 0.04 2020    DIFFTYPE NOT REPORTED 2020     Hemoglobin/Hematocrit:    Lab Results   Component Value Date    HGB 15.7 08/15/2020    HCT 48.2 08/15/2020     CMP:    Lab Results   Component Value Date  08/15/2020    K 4.2 08/15/2020    CL 98 08/15/2020    CO2 27 08/15/2020    BUN 10 08/15/2020    CREATININE 0.93 08/15/2020    GFRAA >60 08/15/2020    LABGLOM >60 08/15/2020    GLUCOSE 255 08/15/2020    PROT 6.8 08/15/2020    LABALBU 3.4 08/15/2020    CALCIUM 8.2 08/15/2020    BILITOT 0.48 08/15/2020    ALKPHOS 111 08/15/2020    AST 38 08/15/2020    ALT 35 08/15/2020     BMP:    Lab Results   Component Value Date     08/15/2020    K 4.2 08/15/2020    CL 98 08/15/2020    CO2 27 08/15/2020    BUN 10 08/15/2020    LABALBU 3.4 08/15/2020    CREATININE 0.93 08/15/2020    CALCIUM 8.2 08/15/2020    GFRAA >60 08/15/2020    LABGLOM >60 08/15/2020    GLUCOSE 255 08/15/2020     PT/INR:    Lab Results   Component Value Date    PROTIME 13.1 08/15/2020    INR 1.0 08/15/2020     PTT:  No results found for: APTT, PTT[APTT}    Physical Examination:        General appearance: Very drowsy cooperative mild distress  Mental Status: Very drowsy not able to communicate very well    Abdomen: soft, mildly distended bloated soft bowel sounds hypoactive   Assessment:        Primary Problem  Acute pancreatitis     Active Hospital Problems    Diagnosis Date Noted    Abdominal pain, epigastric [R10.13]     [facility] [E66.01]     Anxiety [F41.9]     Nausea [R11.0]     Acute pancreatitis [K85.90]     Alcohol abuse [F10.10]     Essential hypertension [I10]     COPD [facility]) [facility] [J44.9]     Type 2 diabetes mellitus, without long-term current use of insulin (HCC) [E11.9]      Past Medical History:   Diagnosis Date    COPD (chronic obstructive pulmonary disease) (HCC)     Diabetes mellitus (HCC)     Edema     [facility] ([facility]     History of diabetes mellitus, type II     Hypertension     Obesity         Plan:        1. May need NG tube if not able to pass gas  2. Continue to follow-up amylase lipase  3. Avoid narcotic if possible until the bowel starts working  4.  Discussed with nursing staff    Explained to the patient and d/W Nursing Staff  Will F/U with you  Please call or Page for any issues or change in status  Thanks    Electronically signed by Amna Velasco MD on 8/16/2020 at 4:26 PM

## 2020-08-16 NOTE — PLAN OF CARE
Regulation:  Goal: Complications related to the disease process, condition or treatment will be avoided or minimized  Description: Complications related to the disease process, condition or treatment will be avoided or minimized  Outcome: Ongoing  Goal: Ability to maintain clinical measurements within normal limits will improve  Description: Ability to maintain clinical measurements within normal limits will improve  Outcome: Ongoing  Goal: Hemodynamic stability will improve  Description: Hemodynamic stability will improve  Outcome: Ongoing     Problem: Sensory:  Goal: Pain level will decrease  Description: Pain level will decrease  Outcome: Ongoing  Goal: Ability to identify factors that increase the pain will improve  Description: Ability to identify factors that increase the pain will improve  Outcome: Ongoing  Goal: Ability to notify healthcare provider of pain before it becomes unmanageable or unbearable will improve  Description: Ability to notify healthcare provider of pain before it becomes unmanageable or unbearable will improve  Outcome: Ongoing  Goal: General experience of comfort will improve  Description: General experience of comfort will improve  Outcome: Ongoing     Problem: Coping:  Goal: Ability to verbalize feelings will improve  Description: Ability to verbalize feelings will improve  Outcome: Ongoing  Goal: Level of anxiety will decrease  Description: Level of anxiety will decrease  Outcome: Ongoing     Problem: Nutritional:  Goal: Ability to achieve adequate nutritional intake will improve  Description: Ability to achieve adequate nutritional intake will improve  Outcome: Ongoing     Problem: Respiratory:  Goal: Ability to achieve and maintain a regular respiratory rate will improve  Description: Ability to achieve and maintain a regular respiratory rate will improve  Outcome: Ongoing     Problem: Skin Integrity:  Goal: Skin integrity will be maintained  Description: Skin integrity will be maintained  Outcome: Ongoing

## 2020-08-16 NOTE — PLAN OF CARE
Problem: Pain:  Goal: Pain level will decrease  Description: Pain level will decrease  8/15/2020 2045 by Amaris Eirckson RN  Outcome: Ongoing     Problem: Pain:  Goal: Control of acute pain  Description: Control of acute pain  8/15/2020 2045 by Amaris Erickson RN  Outcome: Ongoing     Problem: Pain:  Goal: Control of chronic pain  Description: Control of chronic pain  8/15/2020 2045 by Amaris Erickson RN  Outcome: Ongoing     Problem: Falls - Risk of:  Goal: Will remain free from falls  Description: Will remain free from falls  8/15/2020 2045 by Amaris Erickson RN  Outcome: Ongoing     Problem: Falls - Risk of:  Goal: Absence of physical injury  Description: Absence of physical injury  8/15/2020 2045 by Amaris Erickson RN  Outcome: Ongoing     Problem: Activity:  Goal: Risk for activity intolerance will decrease  Description: Risk for activity intolerance will decrease  8/15/2020 2045 by Amaris Erickson RN  Outcome: Ongoing     Problem: Bowel/Gastric:  Goal: Bowel function will improve  Description: Bowel function will improve  8/15/2020 2045 by Amaris Erickson RN  Outcome: Ongoing     Problem: Bowel/Gastric:  Goal: Diagnostic test results will improve  Description: Diagnostic test results will improve  8/15/2020 2045 by Amaris Erickson RN  Outcome: Ongoing     Problem: Bowel/Gastric:  Goal: Occurrences of nausea will decrease  Description: Occurrences of nausea will decrease  8/15/2020 2045 by Amaris Erickson RN  Outcome: Ongoing     Problem:  Bowel/Gastric:  Goal: Occurrences of vomiting will decrease  Description: Occurrences of vomiting will decrease  8/15/2020 2045 by Amaris Erickson RN  Outcome: Ongoing     Problem: Fluid Volume:  Goal: Maintenance of adequate hydration will improve  Description: Maintenance of adequate hydration will improve  8/15/2020 2045 by Amaris Erickson RN  Outcome: Ongoing     Problem: Fluid Volume:  Goal: Will maintain adequate fluid volume  Description: Will maintain adequate fluid volume  8/15/2020 2045 by Bonita White RN  Outcome: Ongoing     Problem: Health Behavior:  Goal: Ability to state signs and symptoms to report to health care provider will improve  Description: Ability to state signs and symptoms to report to health care provider will improve  8/15/2020 2045 by Bonita White RN  Outcome: Ongoing     Problem: Health Behavior:  Goal: Ability to identify changes in lifestyle to reduce recurrence of condition will improve  Description: Ability to identify changes in lifestyle to reduce recurrence of condition will improve  8/15/2020 2045 by Bonita White RN  Outcome: Ongoing     Problem: Physical Regulation:  Goal: Complications related to the disease process, condition or treatment will be avoided or minimized  Description: Complications related to the disease process, condition or treatment will be avoided or minimized  8/15/2020 2045 by Bonita White RN  Outcome: Ongoing     Problem: Physical Regulation:  Goal: Ability to maintain clinical measurements within normal limits will improve  Description: Ability to maintain clinical measurements within normal limits will improve  8/15/2020 2045 by Bonita White RN  Outcome: Ongoing     Problem: Physical Regulation:  Goal: Hemodynamic stability will improve  Description: Hemodynamic stability will improve  8/15/2020 2045 by Bonita White RN  Outcome: Ongoing     Problem: Sensory:  Goal: Pain level will decrease  Description: Pain level will decrease  8/15/2020 2045 by Bonita White RN  Outcome: Ongoing     Problem: Sensory:  Goal: Ability to identify factors that increase the pain will improve  Description: Ability to identify factors that increase the pain will improve  8/15/2020 2045 by Bonita White RN  Outcome: Ongoing     Problem: Sensory:  Goal: Ability to notify healthcare provider of pain before it becomes unmanageable or unbearable will improve  Description: Ability to notify healthcare provider of pain before it becomes unmanageable or unbearable will improve  8/15/2020 2045 by Ramo Hodge RN  Outcome: Ongoing     Problem: Sensory:  Goal: General experience of comfort will improve  Description: General experience of comfort will improve  8/15/2020 2045 by Ramo Hodge RN  Outcome: Ongoing     Problem: Coping:  Goal: Ability to verbalize feelings will improve  Description: Ability to verbalize feelings will improve  8/15/2020 2045 by Ramo Hodge RN  Outcome: Ongoing     Problem: Coping:  Goal: Level of anxiety will decrease  Description: Level of anxiety will decrease  8/15/2020 2045 by Ramo Hodge RN  Outcome: Ongoing     Problem: Nutritional:  Goal: Ability to achieve adequate nutritional intake will improve  Description: Ability to achieve adequate nutritional intake will improve  8/15/2020 2045 by Ramo Hodge RN  Outcome: Ongoing     Problem: Respiratory:  Goal: Ability to achieve and maintain a regular respiratory rate will improve  Description: Ability to achieve and maintain a regular respiratory rate will improve  8/15/2020 2045 by Ramo Hodge RN  Outcome: Ongoing     Problem: Skin Integrity:  Goal: Skin integrity will be maintained  Description: Skin integrity will be maintained  8/15/2020 2045 by Ramo Hodge RN  Outcome: Ongoing

## 2020-08-16 NOTE — PROGRESS NOTES
Occupational Therapy   [facility]  Occupational Therapy Not Seen Note    Patient not available for Occupational Therapy due to:    [] Testing:    [] Hemodialysis    [] Cancelled by RN:    []Refusal by Patient:    [] Surgery:     [] Intubation:     [] Pain Medication:    [] Sedation:     [] Spine Precautions :    [] Medical Instability:    [x] Other: D/C OT, pt is independent    Johanna Husbands, OTR/L

## 2020-08-16 NOTE — CONSULTS
VASCULAR SURGERY   CONSULT        Name: Umberto Torres  MRN: [id number]     Acct: [de-identified]  Room: 2003/2003-02    Admit Date: 8/14/2020  PCP: Jess Garrett PA-C    Physician Requesting Consult:  Dr. Roseline Rene    Reason for Consult: Pancreatitis/possible pseudoaneurysm    Chief Complaint:     Chief Complaint   Patient presents with    Abdominal Pain        History Obtained From:     patient, electronic medical record    History of Present Illness:      Umberto Torres is a  76 y.o.  male who presents with Abdominal Pain  Presents with severe acute pancreatitis. CT demonstrates possible pseudoaneurysm involving the pancreas. On review of the CT he has severe pancreatitis with probable hemorrhagic pseudocyst.  On questioning he drinks approximately a fifth of alcohol per day. Amylase and lipase are currently pending. He does complain of some nausea vomiting. He denies amaurosis fugax, lateralizing symptoms or claudication. Past Medical History:     Past Medical History:   Diagnosis Date    [facility] obstructive [facility]     Diabetes mellitus ([facility]     Edema     Heart attack ([facility] [facility]     History of diabetes mellitus, type II     Hypertension     Obesity         Past Surgical History:     Past Surgical History:   Procedure Laterality Date    HERNIA REPAIR      TONSILLECTOMY          Medications Prior to Admission:       Prior to Admission medications    Medication Sig Start Date End Date Taking?  Authorizing Provider   METHADONE HCL PO Take 70 mg by mouth daily    Yes Historical Provider, MD   lisinopril (PRINIVIL;ZESTRIL) 40 MG tablet Take 1 tablet by mouth 11/23/19  Yes Historical Provider, MD   albuterol sulfate  (90 Base) MCG/ACT inhaler Inhale into the lungs   Yes Historical Provider, MD   carvedilol (COREG) 6.25 MG tablet Take 6.25 tablets by mouth 2 times daily  11/25/19  Yes Historical Provider, MD   metFORMIN (GLUCOPHAGE) 500 MG tablet Take 500 mg by mouth 2 times daily (with meals)   Yes Historical Provider, MD   furosemide (LASIX) 40 MG tablet Take 40 mg by mouth daily    Yes Historical Provider, MD        Allergies:       Patient has no known allergies. Social History:     Tobacco:    reports that he has quit smoking. He has never used smokeless tobacco.  Alcohol:      reports current alcohol use. Drug Use:  reports no history of drug use.     Family History:     Family History   Problem Relation Age of Onset    No Known Problems Mother     Diabetes Father     Heart Disease Father     Heart Disease Paternal Grandfather        Review of Systems:     Positive and Negative as described in HPI    Constitutional:  negative for  fevers, chills, sweats, fatigue, and weight loss  HEENT:  negative for vision or hearing changes,   Respiratory:  negative for shortness of breath, cough, or congestion  Cardiovascular:  negative for  chest pain, palpitations  Gastrointestinal:  negative for nausea, vomiting, diarrhea, constipation, abdominal pain  Genitourinary:  negative for frequency, dysuria  Integument/Breast:  negative for rash, skin lesions  Musculoskeletal:  negative for muscle aches or joint pain  Neurological:  negative for headaches, dizziness, lightheadedness, numbness, pain and tingling extremities  Behavior/Psych:  negative for depression and anxiety    Code Status:  Full Code    Physical Exam:     Vitals:  BP (!) 142/75   Pulse 88   Temp 98.2 °F (36.8 °C) (Oral)   Resp 18   Ht 5' 11\" (1.803 m)   Wt 276 lb 4.8 oz (125.3 kg)   SpO2 93%   BMI 38.54 kg/m²   Temp (24hrs), Av.7 °F (37.1 °C), Min:97.7 °F (36.5 °C), Max:99.5 °F (37.5 °C)      General appearance - alert, well appearing and in no acute distress  Mental status - oriented to person, place and time with normal affect  Head - normocephalic and atraumatic  Eyes - pupils equal and reactive, extraocular eye movements intact, conjunctiva clear  Ears - hearing appears to be intact  Nose - no

## 2020-08-16 NOTE — CONSULTS
General Surgery:  Consult Note        PATIENT NAME: Halina Sutherland   YOB: 1955    ADMISSION DATE: 8/14/2020  2:33 PM     Admitting Provider: Dr. Brooklyn Simmons Physician: Dr. Finn Lewis DATE: 8/16/2020    Chief Complaint:  Abd pain  Consult Regarding:  Pancreatitis     HISTORY OF PRESENT ILLNESS:  The patient is a 76 y.o. male  who was admitted on 8/14/20 for intense abdominal pain and signs/symptoms and laboratory findings consistent with acute pancreatitis. CT imaging revealed acute edematous interstitial pancreatitis with peripancreatic inflammatory changes and globular hyperdense lesions near the tail of the pancreas representing possible pseudoaneurysms versus hematomas. Vascular surgery and GI have been consulted and following. Patient states that he is not have any nausea or emesis at this time. Denies any fever or chills. Denies any bowel movement since admission and states he does not know when he last passed gas but otherwise states abdominal pain has improved. Patient admits to a heavy history of drinking and typically has at least 1/5 of vodka a day. Patient otherwise denies any current cigarette use or nicotine use. Patient does have history of COPD which he takes inhaler for. Patient denies any cardiac cath or stents. Patient denies any aspirin or blood thinner use. Patient admits to history of hypertension, diabetes. Patient denies any illicit drug use. Patient states that he had had a colonoscopy at some point within the last 8 years, uncertain of what facility but was told  No need to follow-up for another 10 years. Patient denies any signs or symptoms of biliary colic in the past.  Patient has never had any episodes like this before to his knowledge. Previous umbilical hernia repair approximately 5 years ago,  Uncertain of what facility had a done at. Patient also has had a right inguinal hernia repair he said many years ago when he was younger.     Past club or organization: Not on file     Attends meetings of clubs or organizations: Not on file     Relationship status: Not on file    Intimate partner violence     Fear of current or ex partner: Not on file     Emotionally abused: Not on file     Physically abused: Not on file     Forced sexual activity: Not on file   Other Topics Concern    Not on file   Social History Narrative    Not on file       Family History:       Problem Relation Age of Onset    No Known Problems Mother     Diabetes Father     Heart Disease Father     Heart Disease Paternal Grandfather        REVIEW OF SYSTEMS:    CONSTITUTIONAL: Denies recent weight loss, fatigue, fevers, chills. HEENT: Denies rhinorrhea, dysphagia, odynphagia. CARDIOVASCULAR: Denies history of MI, recent chest pain. RESPIRATORY: Denies recent history of shortness of breath or history of PE. GASTROINTESTINAL: per hpi  GENITOURINARY: Denies increased frequency or dysuria. HEMATOLOGIC/LYMPHATIC: Denies history of anemia or DVTs. ENDOCRINE: Denies history of thyroid problems or diabetes. NEURO: Denies history of CVA, TIA. Review of systems negative unless listed above. PHYSICAL EXAM:    VITALS:  BP (!) 142/75   Pulse 88   Temp 98.2 °F (36.8 °C) (Oral)   Resp 18   Ht 5' 11\" (1.803 m)   Wt 276 lb 4.8 oz (125.3 kg)   SpO2 93%   BMI 38.54 kg/m²   INTAKE/OUTPUT:     Intake/Output Summary (Last 24 hours) at 8/16/2020 1450  Last data filed at 8/16/2020 1307  Gross per 24 hour   Intake 1763 ml   Output 1650 ml   Net 113 ml       CONSTITUTIONAL:  awake, alert, not distressed   HEENT: Normocephalic/atraumatic, without obvious abnormality. NECK:  Supple, symmetrical, trachea midline   CARDIOVASCULAR: Regular rate and rhythm without murmurs. LUNGS: Clear to auscultation bilaterally without evidence of wheezing or tachypnea.   ABDOMEN: Soft, obese, protuberant, mildly distended, no rebound, minimally tender in epigastric region, no guarding, scars consistent with surgical history  MUSCULOSKELETAL: Muscle strength intact in all extremities bilaterally. NEUROLOGIC: Gross motor intact without focal weakness. SKIN: No cyanosis, rashes, or edema noted. Orientation:   oriented to person, place, and time    CBC with Differential:    Lab Results   Component Value Date    WBC 17.9 08/15/2020    RBC 4.98 08/15/2020    HGB 15.7 08/15/2020    HCT 48.2 08/15/2020     08/15/2020    MCV 96.8 08/15/2020    MCH 31.5 08/15/2020    MCHC 32.6 08/15/2020    RDW 13.0 08/15/2020    LYMPHOPCT 13 08/14/2020    MONOPCT 7 08/14/2020    BASOPCT 0 08/14/2020    MONOSABS 0.90 08/14/2020    LYMPHSABS 1.71 08/14/2020    EOSABS 0.12 08/14/2020    BASOSABS 0.04 08/14/2020    DIFFTYPE NOT REPORTED 08/14/2020     CMP:    Lab Results   Component Value Date     08/15/2020    K 4.2 08/15/2020    CL 98 08/15/2020    CO2 27 08/15/2020    BUN 10 08/15/2020    CREATININE 0.93 08/15/2020    GFRAA >60 08/15/2020    LABGLOM >60 08/15/2020    GLUCOSE 255 08/15/2020    PROT 6.8 08/15/2020    LABALBU 3.4 08/15/2020    CALCIUM 8.2 08/15/2020    BILITOT 0.48 08/15/2020    ALKPHOS 111 08/15/2020    AST 38 08/15/2020    ALT 35 08/15/2020       Pertinent Radiology:   Ct Abdomen Pelvis W Iv Contrast Additional Contrast? None    Result Date: 8/14/2020  EXAMINATION: CT OF THE ABDOMEN AND PELVIS WITH CONTRAST 8/14/2020 3:55 pm TECHNIQUE: CT of the abdomen and pelvis was performed with the administration of intravenous contrast. Multiplanar reformatted images are provided for review. Dose modulation, iterative reconstruction, and/or weight based adjustment of the mA/kV was utilized to reduce the radiation dose to as low as reasonably achievable. COMPARISON: None.  HISTORY: ORDERING SYSTEM PROVIDED HISTORY: abd pain, suspect SBO TECHNOLOGIST PROVIDED HISTORY: abd pain, suspect SBO Reason for Exam: pt states upper abd pain since last night, no BM for 1 1/2 days, nausea Acuity: Acute Type of Exam: Initial FINDINGS: Lower Chest: The visualized heart and lungs show no acute abnormalities. Organs: Diffuse hepatic steatosis. Hepatomegaly. No focal liver lesion. The spleen, adrenal glands and gallbladder show no significant abnormalities. Right kidney unremarkable. Left kidney demonstrates exophytic 5.7 cm cyst in the lateral midpole. Abnormal pancreas. Pancreas diffusely edematous resulting in somewhat heterogeneous attenuation. Marked peripancreatic stranding and some trace of fluid extending into the anterior pararenal spaces consistent with acute edematous interstitial pancreatitis. No clear evidence for necrosis. There are 2 globular moderately hyperdense foci in the anterior pancreatic tail 1.5 cm and 2.0 cm posterior to the 1.5 cm focus. Attenuation similar to the splenic vessels. Appearance concerning for pseudoaneurysm with adjacent hematoma. This would be better evaluated with a multiphasic contrast-enhanced exam. GI/Bowel: There is limited evaluation due to absence of oral contrast.  Some reactive edema of the duodenum secondary to the pancreatitis. No bowel obstruction. No acute process in the colon. Pelvis: Urinary bladder grossly normal.  No suspicious pelvic mass. Peritoneum/Retroperitoneum: No sizable lymphadenopathy. Some reactive awilda hepatis adenopathy, some small nodes noted. Major vessels enhance satisfactorily. No ascites. Bones/Soft Tissues: No acute abnormality of the bones. The superficial soft tissues show no significant abnormalities. 1. Acute edematous interstitial pancreatitis. Peripancreatic inflammatory and edematous changes without discrete fluid collection. 2. Two adjacent globular moderately hyperdense pancreatic tail foci following attenuation of adjacent vessels measuring 1.5 cm and 2.0 cm. Appearance is concerning for a pseudoaneurysm with possible adjacent hematoma.   This would be better assessed with a multiphasic contrast enhanced exam. 3. A 5.7 cm left renal cyst. ASSESSMENT:  Active Hospital Problems    Diagnosis Date Noted    Abdominal pain, epigastric [R10.13]     Morbid obesity ([facility] [address] [E66.01]     Anxiety [F41.9]     Nausea [R11.0]     Acute pancreatitis [K85.90]     Alcohol abuse [F10.10]     Essential hypertension [I10]     COPD (chronic obstructive pulmonary disease) ([facility] [address] [J44.9]     Type 2 diabetes mellitus, without long-term current use of insulin [facility] [E69]      67-year-old male with severe acute pancreatitis due to alcohol, also with findings of pancreatic tail hyperdense areas noted concerning for possible pseudoaneurysms/hematoma    Ileus   Hyperglycemia     Plan:  1. Continue medical mgmt and supportive care per primary  2. GI following, planning on repeat imaging at some point for better delineation of tail the pancreas findings, possible MRCP versus an EUS. 3. Vascular surgery following for possible pancreatic tail pseudoaneurysms/adjacent hematomas, continue Recs per their discretion  4. Continue with n.p.o. at this time and await return of bowel function, continue to trend amylase/lipase and initiate diet once return of bowel function and pain tolerable with normal labs. Continue aggressive electrolyte replacement and goal potassium 4.0, consider replacing thiamine and folate given extensive drinking history. Continue aggressive IV fluid hydration  5. No acute surgical interventions planned at this time, Available as needed    Discussed with Dr. Maria Fernanda Johnson    Thank you,    Electronically signed by Sindy Parker DO  on 8/16/2020 at 2:50 PM     Attending Physician Statement  I have discussed the case, including pertinent history and exam findings with the resident. I agree with the assessment, plan and orders as documented by the resident.

## 2020-08-16 NOTE — PROGRESS NOTES
[facility]    Progress Note    8/16/2020    6:15 PM    Name:   Neyda Hinton  MRN:     [id number]     Acct:      [de-identified]   Room:   2003/2003-02  IP Day:  2  Admit Date:  8/14/2020  2:33 PM    PCP:   Jm Quiroga PA-C, PA-C  Code Status:  Full Code    Subjective:     C/C:   Chief Complaint   Patient presents with    Abdominal Pain     Interval History Status: improved. Upset this morning regarding abdominal pain and narcotic medication. He states he has not had a bowel movements for about 2 to 3 days. Patient is also upset about being n.p.o.    Brief History:     Neyda Hinton is a 76 y.o. Non-/non  male who presents with Abdominal Pain   and is admitted to the hospital for the management of Acute pancreatitis.     The patient reports to the hospital with complaint of abdominal pain. He states that he is experiencing pain to the middle of his abdomen that he describes as sharp, severe and constant. He reports that the pain started yesterday and has progressively worsened. He endorses nausea but no vomiting. He denies diarrhea fever or chills. His last bowel movement was a day and a half ago. No additional symptomology or modifying factors. He has past medical history that includes diabetes, hypertension, COPD. He endorses drinking 1/5 of vodka daily and states that his last alcoholic drink was 2 days ago.     Blood glucose 234, ALT 42, AST 40, lipase 1200, WBC 13. 2.     CT abdomen pelvis with contrast in acute acute edematous interstitial pancreatitis. Peripancreatic inflammatory and edematous changes without discrete fluid collection. 2 adjacent globular moderately hyperdense pancreatic tail foci following attenuation of adjacent vessels measuring 1.5 cm and 2.0 cm. Appearance is concerning for a pseudoaneurysm with possible adjacent hematoma.   This would be very assessed with a multi phasic contrast enhanced exam.  A 5.7 cm left renal cyst.    8/16/2020: Continues to have epigastric pain, inquired about his methadone. Still feels nauseous at this time and unable to eat. States this is his first incident, he states he has been heavily drinking about a pint a day until start his abdominal pain 1 day ago. Discussed alcohol cessation. Review of Systems:     Constitutional:  negative for chills, fevers, sweats  Respiratory:  negative for cough, dyspnea on exertion, shortness of breath, wheezing  Cardiovascular:  negative for chest pain, chest pressure/discomfort, lower extremity edema, palpitations  Gastrointestinal:  Complains of epigastric pain and nausea   Neurological:  negative for dizziness, headache    Medications:      Allergies:  No Known Allergies    Current Meds:   Scheduled Meds:    polyethylene glycol  17 g Oral Daily    bisacodyl  10 mg Rectal Daily    metFORMIN  500 mg Oral BID WC    sodium chloride flush  10 mL Intravenous 2 times per day    insulin lispro  0-12 Units Subcutaneous TID WC    insulin lispro  0-6 Units Subcutaneous Nightly    methadone  70 mg Oral Daily    lisinopril  40 mg Oral Daily    famotidine (PEPCID) injection  20 mg Intravenous BID    enoxaparin  30 mg Subcutaneous BID    multivitamin  1 tablet Oral Daily    carvedilol  6.25 mg Oral BID     Continuous Infusions:    dextrose      lactated ringers 200 mL/hr at 08/16/20 1756     PRN Meds: albuterol sulfate HFA, fleet, hydrALAZINE, sodium chloride flush, glucose, dextrose, glucagon (rDNA), dextrose, potassium chloride, magnesium sulfate, acetaminophen, HYDROcodone 5 mg - acetaminophen **OR** HYDROcodone 5 mg - acetaminophen, HYDROmorphone **OR** HYDROmorphone, promethazine **OR** ondansetron, LORazepam **OR** LORazepam **OR** LORazepam **OR** LORazepam **OR** LORazepam **OR** LORazepam **OR** LORazepam **OR** LORazepam    Data:     Past Medical History:   has a past medical history of COPD (chronic obstructive pulmonary disease) ([facility] [address] Diabetes mellitus ([facility] [address] Edema, Heart attack ([facility] [address] History of diabetes mellitus, type II, Hypertension, and Obesity. Social History:   reports that he has quit smoking. He has never used smokeless tobacco. He reports current alcohol use. He reports that he does not use drugs. Family History:   Family History   Problem Relation Age of Onset    No Known Problems Mother     Diabetes Father     Heart Disease Father     Heart Disease Paternal Grandfather        Vitals:  /86   Pulse 83   Temp 98.6 °F (37 °C) (Oral)   Resp 20   Ht 5' 11\" (1.803 m)   Wt 276 lb 4.8 oz (125.3 kg)   SpO2 92%   BMI 38.54 kg/m²   Temp (24hrs), Av.5 °F (36.9 °C), Min:97.7 °F (36.5 °C), Max:99.3 °F (37.4 °C)    Recent Labs     08/15/20  2006 08/16/20  0617 20  1131 20  1722   POCGLU 216* 236* 348* 217*       I/O (24Hr):     Intake/Output Summary (Last 24 hours) at 2020 1815  Last data filed at 2020 1758  Gross per 24 hour   Intake 2081 ml   Output 1250 ml   Net 831 ml       Labs:  Hematology:  Recent Labs     20  1506 08/15/20  0605   WBC 13.2* 17.9*   RBC 4.79 4.98   HGB 15.3 15.7   HCT 45.7 48.2   MCV 95.4 96.8   MCH 31.9 31.5   MCHC 33.5 32.6   RDW 12.5 13.0    215   MPV 9.9 9.9   INR  --  1.0     Chemistry:  Recent Labs     20  1506 08/15/20  0605   * 137   K 3.9 4.2   CL 95* 98   CO2 25 27   GLUCOSE 234* 255*   BUN 12 10   CREATININE 0.89 0.93   MG  --  2.3   ANIONGAP 14 12   LABGLOM >60 >60   GFRAA >60 >60   CALCIUM 9.5 8.2*   CAION  --  1.15   PHOS  --  2.4*   TROPHS 13  --      Recent Labs     20  1506  08/15/20  0605  08/15/20  1131 08/15/20  1619 08/15/20  2006 08/16/20  0620  11320  1411 20  1722   PROT 7.0  --  6.8  --   --   --   --   --   --   --   --    LABALBU 3.5  --  3.4*  --   --   --   --   --   --   --   --    TSH  --   --  0.96  --   --   --   --   --   --   --   --    AST 40*  --  38  --   --   --   --   --   --   --   -- ALT 42*  --  35  --   --   --   --   --   --   --   --    ALKPHOS 103  --  111  --   --   --   --   --   --   --   --    BILITOT 0.52  --  0.48  --   --   --   --   --   --   --   --    BILIDIR 0.16  --   --   --   --   --   --   --   --   --   --    AMYLASE  --   --  249*  --   --   --   --   --   --  36  --    LIPASE 1,200*  --  581*  --   --   --   --   --   --  57  --    TRIG  --   --  225*  --   --   --   --   --   --   --   --    POCGLU  --    < >  --    < > 237* 221* 216* 236* 348*  --  217*    < > = values in this interval not displayed. ABG:No results found for: POCPH, PHART, PH, POCPCO2, ATY7LNU, PCO2, POCPO2, PO2ART, PO2, POCHCO3, BTE2LXJ, HCO3, NBEA, PBEA, BEART, BE, THGBART, THB, MRB0OUV, XXYH5HHL, U8OGDJFF, O2SAT, FIO2  Lab Results   Component Value Date/Time    SPECIAL LT ARM 8ML 12/06/2019 02:48 AM     Lab Results   Component Value Date/Time    CULTURE NO GROWTH 6 DAYS 12/06/2019 02:48 AM       Radiology:  Ct Abdomen Pelvis W Iv Contrast Additional Contrast? None    Result Date: 8/14/2020  1. Acute edematous interstitial pancreatitis. Peripancreatic inflammatory and edematous changes without discrete fluid collection. 2. Two adjacent globular moderately hyperdense pancreatic tail foci following attenuation of adjacent vessels measuring 1.5 cm and 2.0 cm. Appearance is concerning for a pseudoaneurysm with possible adjacent hematoma.   This would be better assessed with a multiphasic contrast enhanced exam. 3. A 5.7 cm left renal cyst.       Physical Examination:        General appearance:  alert, cooperative and no distress  Mental Status:  oriented to person, place and time and normal affect  Lungs:  clear to auscultation bilaterally, normal effort  Heart:  regular rate and rhythm, no murmur  Abdomen: Abdomen is distended, non-tympanic, no guarding, no rebound tenderness  Extremities:  no edema, redness, tenderness in the calves  Skin:  no gross lesions, rashes, induration    Assessment: Hospital Problems           Last Modified POA    * (Principal) Acute pancreatitis 8/14/2020 Yes    COPD (chronic obstructive pulmonary disease) ([facility] [address] 8/14/2020 Yes    Essential hypertension 8/14/2020 Yes    Type 2 diabetes mellitus, without long-term current use of insulin ([facility] [address] 8/14/2020 Yes    Alcohol abuse 8/14/2020 Yes    Abdominal pain, epigastric 8/15/2020 Yes    [facility] 8/15/2020 Yes    Anxiety 8/15/2020 Yes    Nausea 8/15/2020 Yes          Plan:        1. Patient was unable to tolerate p.o. intake this morning, return to n.p.o. for now  2. Continue Dilaudid and Norco for pain medications. May need to decrease methadone as well because patient has developed an ileus on KUB. 3. Continue MiraLAX and Dulcolax suppository. 4. Enema done. 5. Continue blood pressure medications with hydralazine as needed  6. Lipase now normal  7. 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    Adam Woods DO  8/16/2020  6:15 PM

## 2020-08-16 NOTE — PROGRESS NOTES
Physical Therapy  DATE: 2020    NAME: Jori Gibbs  MRN: [id number]   : 1955    Patient not seen this date for Physical Therapy due to:  [] Blood transfusion in progress  [] Cancel by RN  [] Hemodialysis  []  Refusal by Patient   [] Spine Precautions   [] Strict Bedrest  [] Surgery  [] Testing      [x] Other (Pt states he is fully independent)        [] PT being discontinued at this time. Patient independent. No further needs. [] PT being discontinued at this time as the patient has been transferred to hospice care. No further needs.     Reinier Beaver, PT  09:16

## 2020-08-17 ENCOUNTER — APPOINTMENT (OUTPATIENT)
Dept: GENERAL RADIOLOGY | Age: 65
DRG: 439 | End: 2020-08-17
Payer: MEDICARE

## 2020-08-17 LAB
AMYLASE: 18 U/L (ref 28–100)
ESTIMATED AVERAGE GLUCOSE: 166 MG/DL
GLUCOSE BLD-MCNC: 152 MG/DL (ref 75–110)
GLUCOSE BLD-MCNC: 179 MG/DL (ref 75–110)
GLUCOSE BLD-MCNC: 191 MG/DL (ref 75–110)
GLUCOSE BLD-MCNC: 202 MG/DL (ref 75–110)
HBA1C MFR BLD: 7.4 % (ref 4–6)
LIPASE: 30 U/L (ref 13–60)

## 2020-08-17 PROCEDURE — 1200000000 HC SEMI PRIVATE

## 2020-08-17 PROCEDURE — 82947 ASSAY GLUCOSE BLOOD QUANT: CPT

## 2020-08-17 PROCEDURE — 82150 ASSAY OF AMYLASE: CPT

## 2020-08-17 PROCEDURE — 74018 RADEX ABDOMEN 1 VIEW: CPT

## 2020-08-17 PROCEDURE — 6370000000 HC RX 637 (ALT 250 FOR IP): Performed by: INTERNAL MEDICINE

## 2020-08-17 PROCEDURE — 6360000002 HC RX W HCPCS: Performed by: NURSE PRACTITIONER

## 2020-08-17 PROCEDURE — 83690 ASSAY OF LIPASE: CPT

## 2020-08-17 PROCEDURE — 2580000003 HC RX 258: Performed by: INTERNAL MEDICINE

## 2020-08-17 PROCEDURE — 99233 SBSQ HOSP IP/OBS HIGH 50: CPT | Performed by: INTERNAL MEDICINE

## 2020-08-17 PROCEDURE — 99232 SBSQ HOSP IP/OBS MODERATE 35: CPT | Performed by: INTERNAL MEDICINE

## 2020-08-17 PROCEDURE — 6370000000 HC RX 637 (ALT 250 FOR IP): Performed by: NURSE PRACTITIONER

## 2020-08-17 PROCEDURE — 36415 COLL VENOUS BLD VENIPUNCTURE: CPT

## 2020-08-17 PROCEDURE — 2500000003 HC RX 250 WO HCPCS: Performed by: NURSE PRACTITIONER

## 2020-08-17 RX ORDER — METHADONE HYDROCHLORIDE 10 MG/1
50 TABLET ORAL DAILY
Status: DISCONTINUED | OUTPATIENT
Start: 2020-08-18 | End: 2020-08-19 | Stop reason: HOSPADM

## 2020-08-17 RX ORDER — SODIUM PHOSPHATE, DIBASIC AND SODIUM PHOSPHATE, MONOBASIC 7; 19 G/133ML; G/133ML
1 ENEMA RECTAL 2 TIMES DAILY PRN
Status: DISCONTINUED | OUTPATIENT
Start: 2020-08-17 | End: 2020-08-19 | Stop reason: HOSPADM

## 2020-08-17 RX ADMIN — SODIUM CHLORIDE, POTASSIUM CHLORIDE, SODIUM LACTATE AND CALCIUM CHLORIDE: 600; 310; 30; 20 INJECTION, SOLUTION INTRAVENOUS at 03:34

## 2020-08-17 RX ADMIN — MORPHINE SULFATE 4 MG: 4 INJECTION, SOLUTION INTRAMUSCULAR; INTRAVENOUS at 21:32

## 2020-08-17 RX ADMIN — METHADONE HYDROCHLORIDE 70 MG: 10 TABLET ORAL at 07:35

## 2020-08-17 RX ADMIN — MORPHINE SULFATE 4 MG: 4 INJECTION, SOLUTION INTRAMUSCULAR; INTRAVENOUS at 01:40

## 2020-08-17 RX ADMIN — LISINOPRIL 40 MG: 40 TABLET ORAL at 07:36

## 2020-08-17 RX ADMIN — FAMOTIDINE 20 MG: 10 INJECTION, SOLUTION INTRAVENOUS at 07:35

## 2020-08-17 RX ADMIN — BISACODYL 10 MG: 10 SUPPOSITORY RECTAL at 07:36

## 2020-08-17 RX ADMIN — METFORMIN HYDROCHLORIDE 500 MG: 500 TABLET ORAL at 07:36

## 2020-08-17 RX ADMIN — SODIUM PHOSPHATE, DIBASIC AND SODIUM PHOSPHATE, MONOBASIC 1 ENEMA: 7; 19 ENEMA RECTAL at 15:50

## 2020-08-17 RX ADMIN — ENOXAPARIN SODIUM 30 MG: 30 INJECTION SUBCUTANEOUS at 21:32

## 2020-08-17 RX ADMIN — SODIUM CHLORIDE, POTASSIUM CHLORIDE, SODIUM LACTATE AND CALCIUM CHLORIDE: 600; 310; 30; 20 INJECTION, SOLUTION INTRAVENOUS at 15:07

## 2020-08-17 RX ADMIN — SODIUM PHOSPHATE, DIBASIC AND SODIUM PHOSPHATE, MONOBASIC 1 ENEMA: 7; 19 ENEMA RECTAL at 16:41

## 2020-08-17 RX ADMIN — FAMOTIDINE 20 MG: 10 INJECTION, SOLUTION INTRAVENOUS at 21:32

## 2020-08-17 RX ADMIN — ACETAMINOPHEN 650 MG: 325 TABLET ORAL at 03:17

## 2020-08-17 ASSESSMENT — PAIN SCALES - GENERAL
PAINLEVEL_OUTOF10: 10
PAINLEVEL_OUTOF10: 7
PAINLEVEL_OUTOF10: 10
PAINLEVEL_OUTOF10: 8

## 2020-08-17 ASSESSMENT — PAIN DESCRIPTION - PROGRESSION
CLINICAL_PROGRESSION: NOT CHANGED

## 2020-08-17 ASSESSMENT — PAIN DESCRIPTION - FREQUENCY: FREQUENCY: INTERMITTENT

## 2020-08-17 ASSESSMENT — PAIN DESCRIPTION - PAIN TYPE: TYPE: ACUTE PAIN

## 2020-08-17 ASSESSMENT — PAIN DESCRIPTION - DESCRIPTORS: DESCRIPTORS: ACHING;DISCOMFORT

## 2020-08-17 ASSESSMENT — PAIN DESCRIPTION - LOCATION: LOCATION: ABDOMEN;BACK

## 2020-08-17 ASSESSMENT — PAIN DESCRIPTION - ONSET: ONSET: ON-GOING

## 2020-08-17 NOTE — PROGRESS NOTES
Attempted twice to insert NG Tube. Pt turned red and was not breathing. Pt grabbed NG and pulled out. Pt now refusing NG Tube placement at this time. Dr. Alejandro Boyer notified, orders to follow.

## 2020-08-17 NOTE — PROGRESS NOTES
VASCULAR SURGERY  PROGRESS NOTE      8/17/2020 4:38 PM  Subjective:   Admit Date: 8/14/2020  PCP: Jess Garrett PA-C    Chief Complaint   Patient presents with    Abdominal Pain     Interval History: No complaints. Feeling better. Diet: Diet NPO, After Midnight    Medications:   Scheduled Meds:   [START ON 8/18/2020] methadone  50 mg Oral Daily    polyethylene glycol  17 g Oral Daily    bisacodyl  10 mg Rectal Daily    metFORMIN  500 mg Oral BID WC    sodium chloride flush  10 mL Intravenous 2 times per day    insulin lispro  0-12 Units Subcutaneous TID WC    insulin lispro  0-6 Units Subcutaneous Nightly    lisinopril  40 mg Oral Daily    famotidine (PEPCID) injection  20 mg Intravenous BID    enoxaparin  30 mg Subcutaneous BID    multivitamin  1 tablet Oral Daily    carvedilol  6.25 mg Oral BID     Continuous Infusions:   dextrose      lactated ringers 100 mL/hr at 08/17/20 1507         Labs:   CBC:   Recent Labs     08/15/20  0605   WBC 17.9*   HGB 15.7        BMP:    Recent Labs     08/15/20  0605      K 4.2   CL 98   CO2 27   BUN 10   CREATININE 0.93   GLUCOSE 255*     Hepatic:   Recent Labs     08/15/20  0605   AST 38   ALT 35   BILITOT 0.48   ALKPHOS 111     Troponin: Invalid input(s): TROPONIN  BNP: No results for input(s): BNP in the last 72 hours. Lipids: No results for input(s): CHOL, HDL in the last 72 hours.     Invalid input(s): LDLCALCU  INR:   Recent Labs     08/15/20  0605   INR 1.0       Objective:   Vitals: /86   Pulse 73   Temp 97.5 °F (36.4 °C) (Oral)   Resp 18   Ht 5' 11\" (1.803 m)   Wt 280 lb (127 kg)   SpO2 95%   BMI 39.05 kg/m²   General appearance: alert, cooperative and no distress  Mental Status: oriented to person, place and time with normal affect  Neck: good carotid pulses, no JVD  Lungs: clear to auscultation bilaterally, normal effort  Heart: regular rate and rhythm, no murmur,  Abdomen: soft, obese, non-tender, distended, bowel sounds present all four quadrants, no masses, hepatomegaly, splenomegaly or aortic enlargement  Extremities: no edema, erythema or tenderness in the calves  Skin: no gross lesions, rashes, or induration    Assessment:   3 56-year-old male with severe acute pancreatitis and probable hemorrhagic pseudocyst    Patient Active Problem List:     Cellulitis of left lower extremity     COPD (chronic obstructive pulmonary disease) (HCC)     Coronary artery disease involving native coronary artery of native heart without angina pectoris     Essential hypertension     Type 2 diabetes mellitus, without long-term current use of insulin (HCC)     Foot ulcer, left (HCC)     Chronic heart failure (HCC)     MRSA infection     Acute pancreatitis     Alcohol abuse     Abdominal pain, epigastric     Morbid obesity (HCC)     Anxiety     Nausea      Plan:   1. Continue n.p.o. and IV fluids  2.  Patient unable to tolerate NG tube    Electronically signed by Xena Pate MD on 8/17/2020 at 4:38 PM

## 2020-08-17 NOTE — PROGRESS NOTES
[facility]    Progress Note    8/17/2020    1:05 PM    Name:   Carissa Huang  MRN:     [id number]     [place]:      [de-identified]   Room:   2003/2003-02  IP Day:  3  Admit Date:  8/14/2020  2:33 PM    PCP:   Genevieve Leo PA-C, PA-C  Code Status:  Full Code    Subjective:     C/C:   Chief Complaint   Patient presents with    Abdominal Pain     Interval History Status: improved. Watery bowel movement yesterday after enema, no further bowel movements. Patient still complains of abdominal distention. Abdominal pain has subsided and lipase and amylase were checked yesterday with normal values. Discussed opiate-induced constipation versus ileus from pancreatitis. Possible NG tube placement this afternoon    Brief History:     Carissa Huang is a 76 y.o. Non-/non  male who presents with Abdominal Pain   and is admitted to the hospital for the management of Acute pancreatitis.     The patient reports to the hospital with complaint of abdominal pain. He states that he is experiencing pain to the middle of his abdomen that he describes as sharp, severe and constant. He reports that the pain started yesterday and has progressively worsened. He endorses nausea but no vomiting. He denies diarrhea fever or chills. His last bowel movement was a day and a half ago. No additional symptomology or modifying factors. He has past medical history that includes diabetes, hypertension, COPD. He endorses drinking 1/5 of vodka daily and states that his last alcoholic drink was 2 days ago.     Blood glucose 234, ALT 42, AST 40, lipase 1200, WBC 13. 2.     CT abdomen pelvis with contrast in acute acute edematous interstitial pancreatitis. Peripancreatic inflammatory and edematous changes without discrete fluid collection. 2 adjacent globular moderately hyperdense pancreatic tail foci following attenuation of adjacent vessels measuring 1.5 cm and 2.0 cm. Appearance is concerning for a pseudoaneurysm with possible adjacent hematoma. This would be very assessed with a multi phasic contrast enhanced exam.  A 5.7 cm left renal cyst.    8/16/2020: Continues to have epigastric pain, inquired about his methadone. Still feels nauseous at this time and unable to eat. States this is his first incident, he states he has been heavily drinking about a pint a day until start his abdominal pain 1 day ago. Discussed alcohol cessation. Review of Systems:     Constitutional:  negative for chills, fevers, sweats  Respiratory:  negative for cough, dyspnea on exertion, shortness of breath, wheezing  Cardiovascular:  negative for chest pain, chest pressure/discomfort, lower extremity edema, palpitations  Gastrointestinal:  Complains of epigastric pain and nausea   Neurological:  negative for dizziness, headache    Medications:      Allergies:  No Known Allergies    Current Meds:   Scheduled Meds:    [START ON 8/18/2020] methadone  50 mg Oral Daily    polyethylene glycol  17 g Oral Daily    bisacodyl  10 mg Rectal Daily    metFORMIN  500 mg Oral BID WC    sodium chloride flush  10 mL Intravenous 2 times per day    insulin lispro  0-12 Units Subcutaneous TID WC    insulin lispro  0-6 Units Subcutaneous Nightly    lisinopril  40 mg Oral Daily    famotidine (PEPCID) injection  20 mg Intravenous BID    enoxaparin  30 mg Subcutaneous BID    multivitamin  1 tablet Oral Daily    carvedilol  6.25 mg Oral BID     Continuous Infusions:    dextrose      lactated ringers 200 mL/hr at 08/17/20 0334     PRN Meds: albuterol sulfate HFA, hydrALAZINE, morphine **OR** morphine, sodium chloride flush, glucose, dextrose, glucagon (rDNA), dextrose, potassium chloride, magnesium sulfate, acetaminophen, promethazine **OR** ondansetron, LORazepam **OR** LORazepam **OR** LORazepam **OR** LORazepam **OR** LORazepam **OR** LORazepam **OR** LORazepam **OR** LORazepam    Data:     Past Medical History:   has a past medical history of COPD (chronic obstructive pulmonary disease) ([facility] [address] Diabetes mellitus ([facility] [address] Edema, Heart attack ([facility] [address] History of diabetes mellitus, type II, Hypertension, and Obesity. Social History:   reports that he has quit smoking. He has never used smokeless tobacco. He reports current alcohol use. He reports that he does not use drugs. Family History:   Family History   Problem Relation Age of Onset    No Known Problems Mother     Diabetes Father     Heart Disease Father     Heart Disease Paternal Grandfather        Vitals:  /75   Pulse 63   Temp 98.3 °F (36.8 °C) (Oral)   Resp 18   Ht 5' 11\" (1.803 m)   Wt 280 lb (127 kg)   SpO2 92%   BMI 39.05 kg/m²   Temp (24hrs), Av.6 °F (37 °C), Min:97.4 °F (36.3 °C), Max:99.5 °F (37.5 °C)    Recent Labs     20  1722 20  0623 20  1130   POCGLU 217* 212* 191* 202*       I/O (24Hr):     Intake/Output Summary (Last 24 hours) at 2020 1305  Last data filed at 2020 0617  Gross per 24 hour   Intake 4725.4 ml   Output 950 ml   Net 3775.4 ml       Labs:  Hematology:  Recent Labs     20  1506 08/15/20  0605   WBC 13.2* 17.9*   RBC 4.79 4.98   HGB 15.3 15.7   HCT 45.7 48.2   MCV 95.4 96.8   MCH 31.9 31.5   MCHC 33.5 32.6   RDW 12.5 13.0    215   MPV 9.9 9.9   INR  --  1.0     Chemistry:  Recent Labs     20  1506 08/15/20  0605   * 137   K 3.9 4.2   CL 95* 98   CO2 25 27   GLUCOSE 234* 255*   BUN 12 10   CREATININE 0.89 0.93   MG  --  2.3   ANIONGAP 14 12   LABGLOM >60 >60   GFRAA >60 >60   CALCIUM 9.5 8.2*   CAION  --  1.15   PHOS  --  2.4*   TROPHS 13  --      Recent Labs     20  1506  08/15/20  0605  20  0617 20  1131 20  1411 20  1722 20  2030 20  0500 20  0623 20  1130   PROT 7.0  --  6.8  --   --   --   --   --   --   --   --   --    LABALBU 3.5  --  3.4*  --   --   --   --   --   --   --   --   --    TSH --   --  0.96  --   --   --   --   --   --   --   --   --    AST 40*  --  38  --   --   --   --   --   --   --   --   --    ALT 42*  --  35  --   --   --   --   --   --   --   --   --    ALKPHOS 103  --  111  --   --   --   --   --   --   --   --   --    BILITOT 0.52  --  0.48  --   --   --   --   --   --   --   --   --    BILIDIR 0.16  --   --   --   --   --   --   --   --   --   --   --    AMYLASE  --   --  249*  --   --   --  36  --   --  18*  --   --    LIPASE 1,200*  --  581*  --   --   --  57  --   --  30  --   --    TRIG  --   --  225*  --   --   --   --   --   --   --   --   --    POCGLU  --    < >  --    < > 236* 348*  --  217* 212*  --  191* 202*    < > = values in this interval not displayed. ABG:No results found for: POCPH, PHART, PH, POCPCO2, QJE0LUX, PCO2, POCPO2, PO2ART, PO2, POCHCO3, KQA2XGQ, HCO3, NBEA, PBEA, BEART, BE, THGBART, THB, BCG4TRC, YOKN3JGG, Q1MZVXQX, O2SAT, FIO2  Lab Results   Component Value Date/Time    SPECIAL LT ARM 8ML 12/06/2019 02:48 AM     Lab Results   Component Value Date/Time    CULTURE NO GROWTH 6 DAYS 12/06/2019 02:48 AM       Radiology:  Ct Abdomen Pelvis W Iv Contrast Additional Contrast? None    Result Date: 8/14/2020  1. Acute edematous interstitial pancreatitis. Peripancreatic inflammatory and edematous changes without discrete fluid collection. 2. Two adjacent globular moderately hyperdense pancreatic tail foci following attenuation of adjacent vessels measuring 1.5 cm and 2.0 cm. Appearance is concerning for a pseudoaneurysm with possible adjacent hematoma.   This would be better assessed with a multiphasic contrast enhanced exam. 3. A 5.7 cm left renal cyst.       Physical Examination:        General appearance:  alert, cooperative and no distress  Mental Status:  oriented to person, place and time and normal affect  Lungs:  clear to auscultation bilaterally, normal effort  Heart:  regular rate and rhythm, no murmur  Abdomen: Abdomen is distended, non-tympanic, no guarding, no rebound tenderness  Extremities:  no edema, redness, tenderness in the calves  Skin:  no gross lesions, rashes, induration    Assessment:        Hospital Problems           Last Modified POA    * (Principal) Acute pancreatitis 8/14/2020 Yes    COPD (chronic obstructive pulmonary disease) ([facility] [address] 8/14/2020 Yes    Essential hypertension 8/14/2020 Yes    Type 2 diabetes mellitus, without long-term current use of insulin ([facility] [address] 8/14/2020 Yes    Alcohol abuse 8/14/2020 Yes    Abdominal pain, epigastric 8/15/2020 Yes    Morbid obesity ([facility] [address] 8/15/2020 Yes    Anxiety 8/15/2020 Yes    Nausea 8/15/2020 Yes          Plan:        1. Normal amylase/lipase, decrease IV fluids to 100 cc an hour  2. Decrease methadone to 50 mg daily instead of 70mg  3. GS to place NG tube for ileus  4. No BM today  5. No intervention regarding pancreatic pseudoaneurysms per surgery/vascular surgery   6.  Repeat imaging with MRCP versus EUS for tail of pancreas findings  7. N.p.o.    Fei Souza,   8/17/2020  1:05 PM

## 2020-08-17 NOTE — CARE COORDINATION
Had KUB this AM and has probable ileus. NG to be placed this afternoon. Continue to follow GI and surgeries plan of care.

## 2020-08-17 NOTE — PROGRESS NOTES
GI Progress notes    8/17/2020   3:45 PM    Name:  Ke Haro  MRN:    [id number]     Acct:     [de-identified]   Room:  2003/2003-02  IP Day: 3     Admit Date: 8/14/2020  2:33 PM  PCP: Davin Coello PA-C, KASSIE    Subjective:     C/C:   Chief Complaint   Patient presents with    Abdominal Pain       Interval History: Status: not changed. Patient seen and examined. No acute events overnight. Patient has increased abdominal distention. KUB revealed ileus. He was given enema yesterday and had small liquid bowel movement. No reports of flatus    Amylase, lipase improved. Labs and progress notes reviewed. ROS:  Constitutional: negative for chills, fevers and sweats  Gastrointestinal: negative for constipation, diarrhea, nausea and vomiting      Medications:      Allergies: No Known Allergies    Current Meds: [START ON 8/18/2020] methadone (DOLOPHINE) tablet 50 mg, Daily  albuterol sulfate  (90 Base) MCG/ACT inhaler 2 puff, Q6H PRN  polyethylene glycol (GLYCOLAX) packet 17 g, Daily  bisacodyl (DULCOLAX) suppository 10 mg, Daily  metFORMIN (GLUCOPHAGE) tablet 500 mg, BID WC  hydrALAZINE (APRESOLINE) injection 5 mg, Q6H PRN  morphine (PF) injection 2 mg, Q2H PRN    Or  morphine sulfate (PF) injection 4 mg, Q2H PRN  sodium chloride flush 0.9 % injection 10 mL, 2 times per day  sodium chloride flush 0.9 % injection 10 mL, PRN  glucose (GLUTOSE) 40 % oral gel 15 g, PRN  dextrose 50 % IV solution, PRN  glucagon (rDNA) injection 1 mg, PRN  dextrose 5 % solution, PRN  insulin lispro (HUMALOG) injection vial 0-12 Units, TID WC  insulin lispro (HUMALOG) injection vial 0-6 Units, Nightly  lactated ringers infusion, Continuous  lisinopril (PRINIVIL;ZESTRIL) tablet 40 mg, Daily  potassium chloride 10 mEq/100 mL IVPB (Peripheral Line), PRN  magnesium sulfate 1 g in dextrose 5% 100 mL IVPB, PRN  acetaminophen (TYLENOL) tablet 650 mg, Q4H PRN  promethazine (PHENERGAN) tablet 12.5 mg, Q6H PRN    Or  ondansetron Relationship status: Not on file    Intimate partner violence     Fear of current or ex partner: Not on file     Emotionally abused: Not on file     Physically abused: Not on file     Forced sexual activity: Not on file   Other Topics Concern    Not on file   Social History Narrative    Not on file       Vitals:  /86   Pulse 73   Temp 97.5 °F (36.4 °C) (Oral)   Resp 18   Ht 5' 11\" (1.803 m)   Wt 280 lb (127 kg)   SpO2 95%   BMI 39.05 kg/m²   Temp (24hrs), Av.4 °F (36.9 °C), Min:97.4 °F (36.3 °C), Max:99.5 °F (37.5 °C)    Recent Labs     20  1722 20  0623 20  1130   POCGLU 217* 212* 191* 202*       I/O (24Hr):     Intake/Output Summary (Last 24 hours) at 2020 1545  Last data filed at 2020 0617  Gross per 24 hour   Intake 3494.4 ml   Output 350 ml   Net 3144.4 ml       Labs:      CBC:   Lab Results   Component Value Date    WBC 17.9 08/15/2020    RBC 4.98 08/15/2020    HGB 15.7 08/15/2020    HCT 48.2 08/15/2020    MCV 96.8 08/15/2020    MCH 31.5 08/15/2020    MCHC 32.6 08/15/2020    RDW 13.0 08/15/2020     08/15/2020    MPV 9.9 08/15/2020     CBC with Differential:    Lab Results   Component Value Date    WBC 17.9 08/15/2020    RBC 4.98 08/15/2020    HGB 15.7 08/15/2020    HCT 48.2 08/15/2020     08/15/2020    MCV 96.8 08/15/2020    MCH 31.5 08/15/2020    MCHC 32.6 08/15/2020    RDW 13.0 08/15/2020    LYMPHOPCT 13 2020    MONOPCT 7 2020    BASOPCT 0 2020    MONOSABS 0.90 2020    LYMPHSABS 1.71 2020    EOSABS 0.12 2020    BASOSABS 0.04 2020    DIFFTYPE NOT REPORTED 2020     Hemoglobin/Hematocrit:    Lab Results   Component Value Date    HGB 15.7 08/15/2020    HCT 48.2 08/15/2020     CMP:    Lab Results   Component Value Date     08/15/2020    K 4.2 08/15/2020    CL 98 08/15/2020    CO2 27 08/15/2020    BUN 10 08/15/2020    CREATININE 0.93 08/15/2020    GFRAA >60 08/15/2020    LABGLOM >60 08/15/2020    GLUCOSE 255 08/15/2020    PROT 6.8 08/15/2020    LABALBU 3.4 08/15/2020    CALCIUM 8.2 08/15/2020    BILITOT 0.48 08/15/2020    ALKPHOS 111 08/15/2020    AST 38 08/15/2020    ALT 35 08/15/2020     BMP:    Lab Results   Component Value Date     08/15/2020    K 4.2 08/15/2020    CL 98 08/15/2020    CO2 27 08/15/2020    BUN 10 08/15/2020    LABALBU 3.4 08/15/2020    CREATININE 0.93 08/15/2020    CALCIUM 8.2 08/15/2020    GFRAA >60 08/15/2020    LABGLOM >60 08/15/2020    GLUCOSE 255 08/15/2020     PT/INR:    Lab Results   Component Value Date    PROTIME 13.1 08/15/2020    INR 1.0 08/15/2020     PTT:  No results found for: APTT, PTT[APTT}    Physical Examination:        General appearance: alert, cooperative and no distress  Mental Status: oriented to person, place and time and normal affect  Abdomen: soft, nontender, distended, obese, protuberant    Assessment:        Primary Problem  Acute pancreatitis     Active Hospital Problems    Diagnosis Date Noted    Abdominal pain, epigastric [R10.13]     Morbid obesity ([address] [E66.01]     Anxiety [F41.9]     Nausea [R11.0]     Acute pancreatitis [K85.90]     Alcohol abuse [F10.10]     Essential hypertension [I10]     COPD (chronic obstructive pulmonary disease) ([address] [J44.9]     Type 2 diabetes mellitus, without long-term current use of insulin ([address] [E11.9]      Past Medical History:   Diagnosis Date    COPD (chronic obstructive pulmonary disease) ([address]     Diabetes mellitus ([address]     Edema     Heart attack ([address]     History of diabetes mellitus, type II     Hypertension     Obesity         Plan:        1. Acute pancreatitis  1. Amylase, lipase normalized  2. Ileus likely 2/2 acute pancreatitis  1. NPO  2. NG tube  3. Avoid narcotic pain medications  4.  Discussed with RN    Explained to the patient and d/W Nursing Staff  Will F/U with you  Please call or Page for any issues or change in status  Thanks    Electronically signed by Elia Pereira Bryan Winkler NP on 8/17/2020 at 3:45 PM

## 2020-08-17 NOTE — PROGRESS NOTES
General Surgery Progress Note       PATIENT NAME: Venita Davidson     TODAY'S DATE: 8/17/2020    Chief Complaint   Patient presents with    Abdominal Pain       SUBJECTIVE:    Pt seen and examined. No acute issues overnight. Denies any f/c, n/v, sob or chest pain. +flatus and BMs yesterday. Main complaint is still just epigastric abdominal pain.      Patient's a.m. amylase and lipase resulted and continue to be in appropriate/normal range    ROS  Denies any fevers or chills  Denies any vision or hearing changes  Denies any chest pain or palpitations  Denies any acute SOB or productive cough   + Abd pain, denies any nausea or emesis  Denies any dysuria or frequency   Denies any calf tenderness     OBJECTIVE:   Vitals:  BP (!) 156/71   Pulse 71   Temp 98.4 °F (36.9 °C) (Oral)   Resp 18   Ht 5' 11\" (1.803 m)   Wt 280 lb (127 kg)   SpO2 93%   BMI 39.05 kg/m²      INTAKE/OUTPUT:      Intake/Output Summary (Last 24 hours) at 8/17/2020 0552  Last data filed at 8/16/2020 1758  Gross per 24 hour   Intake 2081 ml   Output 1250 ml   Net 831 ml                 General: AOx3, NAD  Lungs: Symmetrical chest rise bilaterally, no audible wheezes or rhonchi  Heart: S1S2  Abdomen: Soft, distended, protuberant, obese, tender to epigastric region, non peritoneal, no rebound   Extremity: moves all extremities x4, No edema    Data Review:  CBC:   Recent Labs     08/14/20  1506 08/15/20  0605   WBC 13.2* 17.9*   HGB 15.3 15.7    215     BMP:    Recent Labs     08/14/20  1506 08/15/20  0605   * 137   K 3.9 4.2   CL 95* 98   CO2 25 27   BUN 12 10   CREATININE 0.89 0.93   GLUCOSE 234* 255*     Hepatic:   Recent Labs     08/14/20  1506 08/15/20  0605   AST 40* 38   ALT 42* 35   ALKPHOS 103 111   BILITOT 0.52 0.48   BILIDIR 0.16  --      Coagulation:   Recent Labs     08/14/20  1506 08/15/20  0605   PROT 7.0 6.8   INR  --  1.0       ASSESSMENT     Patient Active Problem List   Diagnosis    Cellulitis of left lower extremity  COPD (chronic obstructive pulmonary disease) (HCC)    Coronary artery disease involving native coronary artery of native heart without angina pectoris    Essential hypertension    Type 2 diabetes mellitus, without long-term current use of insulin (HCC)    Foot ulcer, left (HCC)    Chronic heart failure (HCC)    MRSA infection    Acute pancreatitis    Alcohol abuse    Abdominal pain, epigastric    Morbid obesity (HCC)    Anxiety    Nausea   39-year-old male with severe acute pancreatitis due to alcohol, also with findings of pancreatic tail hyperdense areas noted concerning for possible pseudoaneurysms/hematoma     Ileus   Hyperglycemia      Plan:  1. Continue medical mgmt and supportive care per primary  2. GI following, planning on repeat imaging at some point for better delineation of tail the pancreas findings, possible MRCP versus an EUS. 3. Vascular surgery following for possible pancreatic tail pseudoaneurysms/adjacent hematomas, continue Recs per their discretion  4. Remain NPO until no confirmed procedures or tests per GI/IM for today, if none then ok for Trial CLD from surgery stance. cont to lyla bowel function, continue to trend amylase/lipase, Continue aggressive electrolyte replacement and goal potassium 4.0, consider replacing thiamine and folate given extensive drinking history. Continue IV fluid hydration until tolerating adequate PO intake   5. No acute surgical interventions planned at this time, Available as needed. Defer any and all discussions of pain control and/or modalities to primary service     Thank you,    Electronically signed by Josefina Villafuerte DO  on 8/17/2020 at 5:52 AM     Attending Physician Statement  I have discussed the case, including pertinent history and exam findings with the resident. I agree with the assessment, plan and orders as documented by the resident. Patient seen and examined. Agree with above. Possible mild ileus due to pancreatitis. Bowel regimen.   GI evaluation

## 2020-08-17 NOTE — PLAN OF CARE
Problem: Pain:  Goal: Pain level will decrease  Description: Pain level will decrease  8/16/2020 2253 by Josh Garcia RN  Outcome: Ongoing  Goal: Control of acute pain  Description: Control of acute pain  8/17/2020 1234 by Serafin Ng RN  Outcome: Ongoing  Note: Pain level assessment complete. Patient educated on pain scale and control interventions  PRN pain medication given per patient request  Patient instructed to call out with new onset of pain or unrelieved pain    8/16/2020 2253 by Josh Garcia RN  Outcome: Ongoing  Goal: Control of chronic pain  Description: Control of chronic pain  8/16/2020 2253 by Josh Garcia RN  Outcome: Ongoing     Problem: Falls - Risk of:  Goal: Will remain free from falls  Description: Will remain free from falls  8/16/2020 2253 by Josh Garcia RN  Outcome: Ongoing  Goal: Absence of physical injury  Description: Absence of physical injury  8/17/2020 1234 by Serafin Ng RN  Outcome: Ongoing  Note: Siderails up x 2  Hourly rounding  Call light in reach  Instructed to call for assist before attempting out of bed.   Remains free from falls and accidental injury at this time   Floor free from obstacles  Bed is locked and in lowest position  Adequate lighting provided  Bed alarm on, Red Falling star and Stay with Me signs posted      8/16/2020 2253 by Josh Garcia RN  Outcome: Ongoing     Problem: Sensory:  Goal: Pain level will decrease  Description: Pain level will decrease  8/16/2020 2253 by Josh Garcia RN  Outcome: Ongoing

## 2020-08-18 ENCOUNTER — APPOINTMENT (OUTPATIENT)
Dept: MRI IMAGING | Age: 65
DRG: 439 | End: 2020-08-18
Payer: MEDICARE

## 2020-08-18 PROBLEM — K86.3 PANCREATIC PSEUDOCYST: Status: ACTIVE | Noted: 2020-08-18

## 2020-08-18 LAB
BUN BLDV-MCNC: 11 MG/DL (ref 8–23)
CREAT SERPL-MCNC: 0.8 MG/DL (ref 0.7–1.2)
GFR AFRICAN AMERICAN: >60 ML/MIN
GFR NON-AFRICAN AMERICAN: >60 ML/MIN
GFR SERPL CREATININE-BSD FRML MDRD: NORMAL ML/MIN/{1.73_M2}
GFR SERPL CREATININE-BSD FRML MDRD: NORMAL ML/MIN/{1.73_M2}
GLUCOSE BLD-MCNC: 143 MG/DL (ref 75–110)
GLUCOSE BLD-MCNC: 173 MG/DL (ref 75–110)
GLUCOSE BLD-MCNC: 173 MG/DL (ref 75–110)
GLUCOSE BLD-MCNC: 196 MG/DL (ref 75–110)

## 2020-08-18 PROCEDURE — 2500000003 HC RX 250 WO HCPCS: Performed by: NURSE PRACTITIONER

## 2020-08-18 PROCEDURE — 1200000000 HC SEMI PRIVATE

## 2020-08-18 PROCEDURE — 6360000002 HC RX W HCPCS: Performed by: NURSE PRACTITIONER

## 2020-08-18 PROCEDURE — 82565 ASSAY OF CREATININE: CPT

## 2020-08-18 PROCEDURE — 2580000003 HC RX 258: Performed by: INTERNAL MEDICINE

## 2020-08-18 PROCEDURE — 99232 SBSQ HOSP IP/OBS MODERATE 35: CPT | Performed by: INTERNAL MEDICINE

## 2020-08-18 PROCEDURE — A9579 GAD-BASE MR CONTRAST NOS,1ML: HCPCS | Performed by: NURSE PRACTITIONER

## 2020-08-18 PROCEDURE — 6370000000 HC RX 637 (ALT 250 FOR IP): Performed by: NURSE PRACTITIONER

## 2020-08-18 PROCEDURE — 99233 SBSQ HOSP IP/OBS HIGH 50: CPT | Performed by: INTERNAL MEDICINE

## 2020-08-18 PROCEDURE — 36415 COLL VENOUS BLD VENIPUNCTURE: CPT

## 2020-08-18 PROCEDURE — 74183 MRI ABD W/O CNTR FLWD CNTR: CPT

## 2020-08-18 PROCEDURE — 6370000000 HC RX 637 (ALT 250 FOR IP): Performed by: INTERNAL MEDICINE

## 2020-08-18 PROCEDURE — 84520 ASSAY OF UREA NITROGEN: CPT

## 2020-08-18 PROCEDURE — 82947 ASSAY GLUCOSE BLOOD QUANT: CPT

## 2020-08-18 PROCEDURE — 6360000004 HC RX CONTRAST MEDICATION: Performed by: NURSE PRACTITIONER

## 2020-08-18 RX ADMIN — FAMOTIDINE 20 MG: 10 INJECTION, SOLUTION INTRAVENOUS at 08:27

## 2020-08-18 RX ADMIN — METFORMIN HYDROCHLORIDE 500 MG: 500 TABLET ORAL at 08:26

## 2020-08-18 RX ADMIN — METHADONE HYDROCHLORIDE 50 MG: 10 TABLET ORAL at 08:27

## 2020-08-18 RX ADMIN — GADOTERIDOL 20 ML: 279.3 INJECTION, SOLUTION INTRAVENOUS at 18:43

## 2020-08-18 RX ADMIN — SODIUM CHLORIDE, POTASSIUM CHLORIDE, SODIUM LACTATE AND CALCIUM CHLORIDE: 600; 310; 30; 20 INJECTION, SOLUTION INTRAVENOUS at 20:14

## 2020-08-18 RX ADMIN — INSULIN LISPRO 1 UNITS: 100 INJECTION, SOLUTION INTRAVENOUS; SUBCUTANEOUS at 20:13

## 2020-08-18 RX ADMIN — ENOXAPARIN SODIUM 30 MG: 30 INJECTION SUBCUTANEOUS at 08:27

## 2020-08-18 RX ADMIN — CARVEDILOL 6.25 MG: 3.12 TABLET, FILM COATED ORAL at 08:26

## 2020-08-18 RX ADMIN — MULTIVITAMIN TABLET 1 TABLET: TABLET at 08:27

## 2020-08-18 RX ADMIN — INSULIN LISPRO 2 UNITS: 100 INJECTION, SOLUTION INTRAVENOUS; SUBCUTANEOUS at 08:27

## 2020-08-18 RX ADMIN — LISINOPRIL 40 MG: 40 TABLET ORAL at 08:26

## 2020-08-18 RX ADMIN — INSULIN LISPRO 2 UNITS: 100 INJECTION, SOLUTION INTRAVENOUS; SUBCUTANEOUS at 12:21

## 2020-08-18 RX ADMIN — FAMOTIDINE 20 MG: 10 INJECTION, SOLUTION INTRAVENOUS at 20:13

## 2020-08-18 RX ADMIN — CARVEDILOL 6.25 MG: 3.12 TABLET, FILM COATED ORAL at 20:13

## 2020-08-18 RX ADMIN — ENOXAPARIN SODIUM 30 MG: 30 INJECTION SUBCUTANEOUS at 20:13

## 2020-08-18 ASSESSMENT — PAIN SCALES - GENERAL: PAINLEVEL_OUTOF10: 3

## 2020-08-18 NOTE — PLAN OF CARE
Problem: Pain:  Goal: Pain level will decrease  Description: Pain level will decrease  Outcome: Ongoing  Note: Pain level assessed and rated on a 0-10 scale  Assess characteristics of pain  PRN pain medication given per pt request  Non-pharmacological interventions implemented  Report ineffective pain management to physician  Update pt and family of any changes  Pt instructed to call out with new onset of pain  Continue to monitor       Problem: Falls - Risk of:  Goal: Will remain free from falls  Description: Will remain free from falls  8/18/2020 1053 by Chula Costa RN  Outcome: Ongoing  Note: Room free of clutter  Hourly rounding   Non-skid socks worn  Side rails up x2  Bed low and locked  Call light in reach  Instructed to call out before getting out of bed  Anticipatory needs met  Bed alarm on  Falling star at the door and on wristband

## 2020-08-18 NOTE — CARE COORDINATION
NG tube was not placed yesterday due to difficulty with insertion and pt then refused. No surgical interventions planned at this time per Dr. Rubén Burks. Continue to follow GI plan of care and no home needs anticipated.

## 2020-08-18 NOTE — PROGRESS NOTES
[facility]    Progress Note    8/18/2020    3:26 PM    Name:   Galina Swain  MRN:     [id number]     Shanna:      [de-identified]   Room:   2003/2003-02  IP Day:  4  Admit Date:  8/14/2020  2:33 PM    PCP:   Mitzy Guzman PA-C, PA-C  Code Status:  Full Code    Subjective:     C/C:   Chief Complaint   Patient presents with    Abdominal Pain     Interval History Status: improved. Patient's present complaint of abdominal pain is largely improved. Continues to have abdominal distention which is slowly improving. Now passing gas and had a small BM yesterday. Denies any fevers or chills, chest pain, shortness of breath or other complaints. Brief History: This is a 57-year-old male who presents with a complaint of severe upper abdominal pain and was found to have evidence of acute pancreatitis. Pancreatitis most likely related to his daily consumption of alcohol has been improving with bowel rest.  He has developed an ileus associated with the symptoms and continues to have abdominal distention which is slowly improving. Review of Systems:     Constitutional:  negative for chills, fevers, sweats  Respiratory:  negative for cough, dyspnea on exertion, shortness of breath, wheezing  Cardiovascular:  negative for chest pain, chest pressure/discomfort, lower extremity edema, palpitations  Gastrointestinal:  negative for abdominal pain, constipation, diarrhea, nausea, vomiting  Neurological:  negative for dizziness, headache    Medications:      Allergies:  No Known Allergies    Current Meds:   Scheduled Meds:    methadone  50 mg Oral Daily    polyethylene glycol  17 g Oral Daily    bisacodyl  10 mg Rectal Daily    metFORMIN  500 mg Oral BID WC    sodium chloride flush  10 mL Intravenous 2 times per day    insulin lispro  0-12 Units Subcutaneous TID WC    insulin lispro  0-6 Units Subcutaneous Nightly    lisinopril  40 mg Oral Daily    famotidine (PEPCID) injection  20 mg Intravenous BID    enoxaparin  30 mg Subcutaneous BID    multivitamin  1 tablet Oral Daily    carvedilol  6.25 mg Oral BID     Continuous Infusions:    dextrose      lactated ringers 100 mL/hr at 20 1507     PRN Meds: fleet, albuterol sulfate HFA, hydrALAZINE, morphine **OR** morphine, sodium chloride flush, glucose, dextrose, glucagon (rDNA), dextrose, potassium chloride, magnesium sulfate, acetaminophen, promethazine **OR** ondansetron, LORazepam **OR** LORazepam **OR** LORazepam **OR** LORazepam **OR** LORazepam **OR** LORazepam **OR** LORazepam **OR** LORazepam    Data:     Past Medical History:   has a past medical history of COPD (chronic obstructive pulmonary disease) ([facility] Diabetes mellitus ([facility] Edema, Heart attack ([facility] History of diabetes mellitus, type II, Hypertension, and Obesity. Social History:   reports that he has quit smoking. He has never used smokeless tobacco. He reports current alcohol use. He reports that he does not use drugs. Family History:   Family History   Problem Relation Age of Onset    No Known Problems Mother     Diabetes Father     Heart Disease Father     Heart Disease Paternal Grandfather        Vitals:  /82   Pulse 60   Temp 98.4 °F (36.9 °C) (Oral)   Resp 18   Ht 5' 11\" (1.803 m)   Wt 281 lb (127.5 kg)   SpO2 94%   BMI 39.19 kg/m²   Temp (24hrs), Av.8 °F (36.6 °C), Min:97.3 °F (36.3 °C), Max:98.4 °F (36.9 °C)    Recent Labs     20  1648 20  1959 20  0615 20  1106   POCGLU 179* 152* 143* 173*       I/O (24Hr): Intake/Output Summary (Last 24 hours) at 2020 1526  Last data filed at 2020 0522  Gross per 24 hour   Intake 1050 ml   Output 450 ml   Net 600 ml       Labs:  Hematology:No results for input(s): WBC, RBC, HGB, HCT, MCV, MCH, MCHC, RDW, PLT, MPV, SEDRATE, CRP, INR, DDIMER, [id number], LABABSO in the last 72 hours.     Invalid input(s): PT  Chemistry:No results for input(s): NA, K, CL, CO2, GLUCOSE, BUN, CREATININE, MG, ANIONGAP, LABGLOM, GFRAA, CALCIUM, CAION, PHOS, PSA, PROBNP, TROPHS, CKTOTAL, CKMB, CKMBINDEX, MYOGLOBIN, DIGOXIN, LACTACIDWB in the last 72 hours. Recent Labs     08/16/20  1411  08/17/20  0500 08/17/20  0623 08/17/20  1130 08/17/20  1648 08/17/20  1959 08/18/20  0615 08/18/20  1106   AMYLASE 36  --  18*  --   --   --   --   --   --    LIPASE 57  --  30  --   --   --   --   --   --    POCGLU  --    < >  --  191* 202* 179* 152* 143* 173*    < > = values in this interval not displayed. ABG:No results found for: POCPH, PHART, PH, POCPCO2, WHY6AEJ, PCO2, POCPO2, PO2ART, PO2, POCHCO3, DKE6WND, HCO3, NBEA, PBEA, BEART, BE, THGBART, THB, WFP7TVQ, PYGI3UPF, Z2NAKMCP, O2SAT, FIO2  Lab Results   Component Value Date/Time    SPECIAL LT ARM 8ML 12/06/2019 02:48 AM     Lab Results   Component Value Date/Time    CULTURE NO GROWTH 6 DAYS 12/06/2019 02:48 AM       Radiology:  Margene Layer Abdomen (kub) (single Ap View)    Result Date: 8/17/2020  Similar mild ileus. Xr Abdomen (kub) (single Ap View)    Result Date: 8/17/2020  Similar appearance again demonstrating findings suggesting an ileus     Xr Abdomen (kub) (single Ap View)    Result Date: 8/16/2020  Probable developing adynamic ileus related to acute pancreatitis. Ct Abdomen Pelvis W Iv Contrast Additional Contrast? None    Result Date: 8/14/2020  1. Acute edematous interstitial pancreatitis. Peripancreatic inflammatory and edematous changes without discrete fluid collection. 2. Two adjacent globular moderately hyperdense pancreatic tail foci following attenuation of adjacent vessels measuring 1.5 cm and 2.0 cm. Appearance is concerning for a pseudoaneurysm with possible adjacent hematoma.   This would be better assessed with a multiphasic contrast enhanced exam. 3. A 5.7 cm left renal cyst.       Physical Examination:        General appearance:  alert, cooperative and no distress  Mental Status:  oriented to person, place and time and normal affect  Lungs:  clear to auscultation bilaterally, normal effort  Heart:  regular rate and rhythm, no murmur  Abdomen:  soft, nontender, moderately distended, normal bowel sounds, no masses, hepatomegaly, splenomegaly  Extremities:  no edema, redness, tenderness in the calves  Skin:  no gross lesions, rashes, induration    Assessment:        Hospital Problems           Last Modified POA    * (Principal) Acute pancreatitis 8/14/2020 Yes    COPD without exacerbation ([address] 8/18/2020 Yes    Essential hypertension 8/14/2020 Yes    Type 2 diabetes mellitus, without long-term current use of insulin ([address] 8/14/2020 Yes    Alcohol abuse 8/14/2020 Yes    Abdominal pain, epigastric 8/15/2020 Yes    Morbid obesity ([address] 8/15/2020 Yes    Anxiety 8/15/2020 Yes    Nausea 8/15/2020 Yes    Ileus ([address] 8/17/2020 Yes    Adynamic ileus ([address] 8/17/2020 Yes    Pancreatic pseudocyst 8/18/2020 Yes          Plan:        1. Advance diet as tolerated, discharge planning pending dietary tolerance  2. Insulin scale  3. Alcohol cessation  4. Oxygen aerosols as needed  5. GI and DVT prophylaxis  6. Increase activity  7. Trend labs, correct electrolytes as needed  8. Multivitamin daily  9. Monitoring control blood pressure  10.  Surgery and GI evaluations    Mitra Baker DO  8/18/2020  3:26 PM

## 2020-08-18 NOTE — PROGRESS NOTES
VASCULAR SURGERY  PROGRESS NOTE      8/18/2020 3:08 PM  Subjective:   Admit Date: 8/14/2020  PCP: Kaelyn Soliman PA-C    Chief Complaint   Patient presents with    Abdominal Pain     Interval History: No complaints. Feeling better. Had BM and flatus. Diet: DIET CLEAR LIQUID;    Medications:   Scheduled Meds:   methadone  50 mg Oral Daily    polyethylene glycol  17 g Oral Daily    bisacodyl  10 mg Rectal Daily    metFORMIN  500 mg Oral BID WC    sodium chloride flush  10 mL Intravenous 2 times per day    insulin lispro  0-12 Units Subcutaneous TID WC    insulin lispro  0-6 Units Subcutaneous Nightly    lisinopril  40 mg Oral Daily    famotidine (PEPCID) injection  20 mg Intravenous BID    enoxaparin  30 mg Subcutaneous BID    multivitamin  1 tablet Oral Daily    carvedilol  6.25 mg Oral BID     Continuous Infusions:   dextrose      lactated ringers 100 mL/hr at 08/17/20 1507         Labs:   CBC:   No results for input(s): WBC, HGB, PLT in the last 72 hours. BMP:    No results for input(s): NA, K, CL, CO2, BUN, CREATININE, GLUCOSE in the last 72 hours. Hepatic:   No results for input(s): AST, ALT, ALB, BILITOT, ALKPHOS in the last 72 hours. Troponin: Invalid input(s): TROPONIN  BNP: No results for input(s): BNP in the last 72 hours. Lipids: No results for input(s): CHOL, HDL in the last 72 hours. Invalid input(s): LDLCALCU  INR:   No results for input(s): INR in the last 72 hours.     Objective:   Vitals: /82   Pulse 60   Temp 98.4 °F (36.9 °C) (Oral)   Resp 18   Ht 5' 11\" (1.803 m)   Wt 281 lb (127.5 kg)   SpO2 94%   BMI 39.19 kg/m²   General appearance: alert, cooperative and no distress  Mental Status: oriented to person, place and time with normal affect  Neck: good carotid pulses, no JVD  Lungs: clear to auscultation bilaterally, normal effort  Heart: regular rate and rhythm, no murmur,  Abdomen: soft, obese, non-tender, distended, bowel sounds present all four quadrants, no masses, hepatomegaly, splenomegaly or aortic enlargement  Extremities: no edema, erythema or tenderness in the calves  Skin: no gross lesions, rashes, or induration    Assessment:   3 62-year-old male with severe acute pancreatitis and probable hemorrhagic pseudocyst    Patient Active Problem List:     Cellulitis of left lower extremity     COPD (chronic obstructive pulmonary disease) (HCC)     Coronary artery disease involving native coronary artery of native heart without angina pectoris     Essential hypertension     Type 2 diabetes mellitus, without long-term current use of insulin (HCC)     Foot ulcer, left (HCC)     Chronic heart failure (HCC)     MRSA infection     Acute pancreatitis     Alcohol abuse     Abdominal pain, epigastric     Morbid obesity (HCC)     Anxiety     Nausea      Plan:   1. Diet per general surgery  2.  Overall appears to be improving    Electronically signed by Shabana Hand MD on 8/18/2020 at 3:08 PM

## 2020-08-18 NOTE — PROGRESS NOTES
 Essential hypertension    Type 2 diabetes mellitus, without long-term current use of insulin (HCC)    Foot ulcer, left (HCC)    Chronic heart failure (HCC)    MRSA infection    Acute pancreatitis    Alcohol abuse    Abdominal pain, epigastric    Morbid obesity (HCC)    Anxiety    Nausea    Ileus (HCC)    Adynamic ileus [facility])   59-year-old male with severe acute pancreatitis due to alcohol, also with findings of pancreatic tail hyperdense areas noted concerning for possible pseudoaneurysms/hematoma     Ileus- resolved   Hyperglycemia      Plan:  1. Continue medical mgmt and supportive care per primary  2. GI following, planning on possible repeat imaging at some point for better delineation of tail the pancreas findings, possible MRCP versus an EUS. 3. Vascular surgery following for possible pancreatic tail pseudoaneurysms/adjacent hematomas, continue Recs per their discretion  4. Ok for CLD from surgery stance, adv as tolerated,  cont to lyla bowel function, and cont bowel regimen, Continue aggressive electrolyte replacement and goal potassium 4.0, consider replacing thiamine and folate given extensive drinking history. Continue IV fluid hydration until tolerating adequate PO intake   5. No acute surgical interventions planned at this time, Available as needed. Defer any and all discussions of pain control and/or modalities to primary service     Thank you,    Electronically signed by Marie Alfaro DO  on 8/18/2020 at 4:46 AM     Attending Physician Statement  I have discussed the case, including pertinent history and exam findings with the resident. I agree with the assessment, plan and orders as documented by the resident. Advance diet as tolerated.   Patient is having BM's and denies nausea

## 2020-08-18 NOTE — PLAN OF CARE
Problem: Pain:  Goal: Control of acute pain  Description: Control of acute pain  8/18/2020 0031 by Reva Benitez RN  Outcome: Ongoing  Note: Pain level assessment complete. Patient educated on pain scale and control interventions  Morphine IV PRN pain medication given per patient request. Trying to avoid using if able. Patient instructed to call out with new onset of pain or unrelieved pain  Will continue to monitor. Problem: Falls - Risk of:  Goal: Will remain free from falls  Description: Will remain free from falls  Outcome: Ongoing  Note: Siderails up x 2  Hourly rounding  Call light in reach  Instructed to call for assist before attempting out of bed. Remains free from falls and accidental injury at this time   Floor free from obstacles  Bed is locked and in lowest position  Adequate lighting provided  Will continue to monitor. Problem: Bowel/Gastric:  Goal: Bowel function will improve  Description: Bowel function will improve  Outcome: Ongoing  Note: Pt is now having bowel movements and is passing gas. Active bowel sounds. Pt remains NPO.   Will continue to monitor     Problem: Fluid Volume:  Goal: Will maintain adequate fluid volume  Description: Will maintain adequate fluid volume  Outcome: Ongoing

## 2020-08-18 NOTE — PROGRESS NOTES
PRN  promethazine (PHENERGAN) tablet 12.5 mg, Q6H PRN    Or  ondansetron (ZOFRAN) injection 4 mg, Q6H PRN  famotidine (PEPCID) injection 20 mg, BID  enoxaparin (LOVENOX) injection 30 mg, BID  multivitamin 1 tablet, Daily  LORazepam (ATIVAN) tablet 1 mg, Q1H PRN    Or  LORazepam (ATIVAN) injection 1 mg, Q1H PRN    Or  LORazepam (ATIVAN) tablet 2 mg, Q1H PRN    Or  LORazepam (ATIVAN) injection 2 mg, Q1H PRN    Or  LORazepam (ATIVAN) tablet 3 mg, Q1H PRN    Or  LORazepam (ATIVAN) injection 3 mg, Q1H PRN    Or  LORazepam (ATIVAN) tablet 4 mg, Q1H PRN    Or  LORazepam (ATIVAN) injection 4 mg, Q1H PRN  carvedilol (COREG) tablet 6.25 mg, BID        Data:     Code Status:  Full Code    Family History   Problem Relation Age of Onset    No Known Problems Mother     Diabetes Father     Heart Disease Father     Heart Disease Paternal Grandfather        Social History     Socioeconomic History    Marital status:      Spouse name: Not on file    Number of children: Not on file    Years of education: Not on file    Highest education level: Not on file   Occupational History    Not on file   Social Needs    Financial resource strain: Not on file    Food insecurity     Worry: Not on file     Inability: Not on file    Transportation needs     Medical: Not on file     Non-medical: Not on file   Tobacco Use    Smoking status: Former Smoker    Smokeless tobacco: Never Used    Tobacco comment: quit \"a couple years ago\"   Substance and Sexual Activity    Alcohol use: Yes     Comment: 1/5 a day    Drug use: No     Comment: history of drug abuse, pt takes methadone    Sexual activity: Not on file   Lifestyle    Physical activity     Days per week: Not on file     Minutes per session: Not on file    Stress: Not on file   Relationships    Social connections     Talks on phone: Not on file     Gets together: Not on file     Attends Gnosticism service: Not on file     Active member of club or organization: Not on file pancreatitis  1. Improved  2. + flatus, + bowel movements  3. Clear liquid diet  4.  Avoid narcotic pain medications    Explained to the patient and d/W Nursing Staff  Will F/U with you  Please call or Page for any issues or change in status  Thanks    Electronically signed by KATELYN Medrano NP on 8/18/2020 at 12:55 PM

## 2020-08-19 VITALS
SYSTOLIC BLOOD PRESSURE: 185 MMHG | OXYGEN SATURATION: 96 % | BODY MASS INDEX: 39.34 KG/M2 | RESPIRATION RATE: 18 BRPM | TEMPERATURE: 98.8 F | DIASTOLIC BLOOD PRESSURE: 83 MMHG | HEART RATE: 70 BPM | HEIGHT: 71 IN | WEIGHT: 281 LBS

## 2020-08-19 LAB
ANION GAP SERPL CALCULATED.3IONS-SCNC: 11 MMOL/L (ref 9–17)
BUN BLDV-MCNC: 9 MG/DL (ref 8–23)
BUN/CREAT BLD: 12 (ref 9–20)
CALCIUM SERPL-MCNC: 8.2 MG/DL (ref 8.6–10.4)
CHLORIDE BLD-SCNC: 100 MMOL/L (ref 98–107)
CO2: 28 MMOL/L (ref 20–31)
CREAT SERPL-MCNC: 0.75 MG/DL (ref 0.7–1.2)
GFR AFRICAN AMERICAN: >60 ML/MIN
GFR NON-AFRICAN AMERICAN: >60 ML/MIN
GFR SERPL CREATININE-BSD FRML MDRD: ABNORMAL ML/MIN/{1.73_M2}
GFR SERPL CREATININE-BSD FRML MDRD: ABNORMAL ML/MIN/{1.73_M2}
GLUCOSE BLD-MCNC: 186 MG/DL (ref 70–99)
GLUCOSE BLD-MCNC: 190 MG/DL (ref 75–110)
GLUCOSE BLD-MCNC: 336 MG/DL (ref 75–110)
POTASSIUM SERPL-SCNC: 3.6 MMOL/L (ref 3.7–5.3)
SODIUM BLD-SCNC: 139 MMOL/L (ref 135–144)

## 2020-08-19 PROCEDURE — 2500000003 HC RX 250 WO HCPCS: Performed by: NURSE PRACTITIONER

## 2020-08-19 PROCEDURE — 82947 ASSAY GLUCOSE BLOOD QUANT: CPT

## 2020-08-19 PROCEDURE — 6370000000 HC RX 637 (ALT 250 FOR IP): Performed by: NURSE PRACTITIONER

## 2020-08-19 PROCEDURE — 6370000000 HC RX 637 (ALT 250 FOR IP): Performed by: INTERNAL MEDICINE

## 2020-08-19 PROCEDURE — 6360000002 HC RX W HCPCS: Performed by: NURSE PRACTITIONER

## 2020-08-19 PROCEDURE — 80048 BASIC METABOLIC PNL TOTAL CA: CPT

## 2020-08-19 PROCEDURE — 99232 SBSQ HOSP IP/OBS MODERATE 35: CPT | Performed by: INTERNAL MEDICINE

## 2020-08-19 PROCEDURE — 36415 COLL VENOUS BLD VENIPUNCTURE: CPT

## 2020-08-19 PROCEDURE — 99233 SBSQ HOSP IP/OBS HIGH 50: CPT | Performed by: INTERNAL MEDICINE

## 2020-08-19 RX ORDER — MULTIVITAMIN WITH IRON
1 TABLET ORAL DAILY
Refills: 0 | COMMUNITY
Start: 2020-08-20

## 2020-08-19 RX ADMIN — FAMOTIDINE 20 MG: 10 INJECTION, SOLUTION INTRAVENOUS at 07:59

## 2020-08-19 RX ADMIN — METFORMIN HYDROCHLORIDE 500 MG: 500 TABLET ORAL at 07:56

## 2020-08-19 RX ADMIN — MULTIVITAMIN TABLET 1 TABLET: TABLET at 07:56

## 2020-08-19 RX ADMIN — CARVEDILOL 6.25 MG: 3.12 TABLET, FILM COATED ORAL at 07:56

## 2020-08-19 RX ADMIN — INSULIN LISPRO 2 UNITS: 100 INJECTION, SOLUTION INTRAVENOUS; SUBCUTANEOUS at 07:57

## 2020-08-19 RX ADMIN — INSULIN LISPRO 8 UNITS: 100 INJECTION, SOLUTION INTRAVENOUS; SUBCUTANEOUS at 11:45

## 2020-08-19 RX ADMIN — MORPHINE SULFATE 4 MG: 4 INJECTION, SOLUTION INTRAMUSCULAR; INTRAVENOUS at 00:32

## 2020-08-19 RX ADMIN — ENOXAPARIN SODIUM 30 MG: 30 INJECTION SUBCUTANEOUS at 07:57

## 2020-08-19 RX ADMIN — METHADONE HYDROCHLORIDE 50 MG: 10 TABLET ORAL at 07:56

## 2020-08-19 RX ADMIN — LISINOPRIL 40 MG: 40 TABLET ORAL at 07:56

## 2020-08-19 ASSESSMENT — PAIN DESCRIPTION - LOCATION: LOCATION: ABDOMEN;BACK

## 2020-08-19 ASSESSMENT — PAIN DESCRIPTION - DESCRIPTORS: DESCRIPTORS: SHOOTING;ACHING

## 2020-08-19 ASSESSMENT — PAIN DESCRIPTION - FREQUENCY: FREQUENCY: INTERMITTENT

## 2020-08-19 ASSESSMENT — PAIN DESCRIPTION - PROGRESSION: CLINICAL_PROGRESSION: GRADUALLY WORSENING

## 2020-08-19 ASSESSMENT — PAIN DESCRIPTION - ONSET: ONSET: ON-GOING

## 2020-08-19 ASSESSMENT — PAIN SCALES - GENERAL
PAINLEVEL_OUTOF10: 7
PAINLEVEL_OUTOF10: 8

## 2020-08-19 ASSESSMENT — PAIN DESCRIPTION - PAIN TYPE: TYPE: ACUTE PAIN

## 2020-08-19 NOTE — PLAN OF CARE
Problem: Pain:  Goal: Pain level will decrease  Description: Pain level will decrease  Outcome: Ongoing  Note: Pain level assessed and rated on a 0-10 scale  Assess characteristics of pain  PRN pain medication given per pt request  Non-pharmacological interventions implemented  Report ineffective pain management to physician  Update pt and family of any changes  Pt instructed to call out with new onset of pain  Continue to monitor       Problem: Falls - Risk of:  Goal: Will remain free from falls  Description: Will remain free from falls  8/19/2020 1026 by Kash Blanton RN  Outcome: Ongoing  Note: Room free of clutter  Hourly rounding   Non-skid socks worn  Side rails up x2  Bed low and locked  Call light in reach  Instructed to call out before getting out of bed  Anticipatory needs met  Bed alarm on  Falling star at the door and on wristband

## 2020-08-19 NOTE — PROGRESS NOTES
4 mg, Q6H PRN  famotidine (PEPCID) injection 20 mg, BID  enoxaparin (LOVENOX) injection 30 mg, BID  multivitamin 1 tablet, Daily  LORazepam (ATIVAN) tablet 1 mg, Q1H PRN    Or  LORazepam (ATIVAN) injection 1 mg, Q1H PRN    Or  LORazepam (ATIVAN) tablet 2 mg, Q1H PRN    Or  LORazepam (ATIVAN) injection 2 mg, Q1H PRN    Or  LORazepam (ATIVAN) tablet 3 mg, Q1H PRN    Or  LORazepam (ATIVAN) injection 3 mg, Q1H PRN    Or  LORazepam (ATIVAN) tablet 4 mg, Q1H PRN    Or  LORazepam (ATIVAN) injection 4 mg, Q1H PRN  carvedilol (COREG) tablet 6.25 mg, BID        Data:     Code Status:  Full Code    Family History   Problem Relation Age of Onset    No Known Problems Mother     Diabetes Father     Heart Disease Father     Heart Disease Paternal Grandfather        Social History     Socioeconomic History    Marital status:      Spouse name: Not on file    Number of children: Not on file    Years of education: Not on file    Highest education level: Not on file   Occupational History    Not on file   Social Needs    Financial resource strain: Not on file    Food insecurity     Worry: Not on file     Inability: Not on file    Transportation needs     Medical: Not on file     Non-medical: Not on file   Tobacco Use    Smoking status: Former Smoker    Smokeless tobacco: Never Used    Tobacco comment: quit \"a couple years ago\"   Substance and Sexual Activity    Alcohol use: Yes     Comment: 1/5 a day    Drug use: No     Comment: history of drug abuse, pt takes methadone    Sexual activity: Not on file   Lifestyle    Physical activity     Days per week: Not on file     Minutes per session: Not on file    Stress: Not on file   Relationships    Social connections     Talks on phone: Not on file     Gets together: Not on file     Attends Baptism service: Not on file     Active member of club or organization: Not on file     Attends meetings of clubs or organizations: Not on file     Relationship status: Not on file    Intimate partner violence     Fear of current or ex partner: Not on file     Emotionally abused: Not on file     Physically abused: Not on file     Forced sexual activity: Not on file   Other Topics Concern    Not on file   Social History Narrative    Not on file       Vitals:  BP (!) 185/83   Pulse 70   Temp 98.8 °F (37.1 °C) (Oral)   Resp 18   Ht 5' 11\" (1.803 m)   Wt 281 lb (127.5 kg)   SpO2 96%   BMI 39.19 kg/m²   Temp (24hrs), Av.1 °F (36.7 °C), Min:97.5 °F (36.4 °C), Max:98.8 °F (37.1 °C)    Recent Labs     20  1606 20  1955 20  0614 20  1112   POCGLU 173* 196* 190* 336*       I/O (24Hr):   No intake or output data in the 24 hours ending 20 1413    Labs:      CBC:   Lab Results   Component Value Date    WBC 17.9 08/15/2020    RBC 4.98 08/15/2020    HGB 15.7 08/15/2020    HCT 48.2 08/15/2020    MCV 96.8 08/15/2020    MCH 31.5 08/15/2020    MCHC 32.6 08/15/2020    RDW 13.0 08/15/2020     08/15/2020    MPV 9.9 08/15/2020     CBC with Differential:    Lab Results   Component Value Date    WBC 17.9 08/15/2020    RBC 4.98 08/15/2020    HGB 15.7 08/15/2020    HCT 48.2 08/15/2020     08/15/2020    MCV 96.8 08/15/2020    MCH 31.5 08/15/2020    MCHC 32.6 08/15/2020    RDW 13.0 08/15/2020    LYMPHOPCT 13 2020    MONOPCT 7 2020    BASOPCT 0 2020    MONOSABS 0.90 2020    LYMPHSABS 1.71 2020    EOSABS 0.12 2020    BASOSABS 0.04 2020    DIFFTYPE NOT REPORTED 2020     Hemoglobin/Hematocrit:    Lab Results   Component Value Date    HGB 15.7 08/15/2020    HCT 48.2 08/15/2020     CMP:    Lab Results   Component Value Date     2020    K 3.6 2020     2020    CO2 28 2020    BUN 9 2020    CREATININE 0.75 2020    GFRAA >60 2020    LABGLOM >60 2020    GLUCOSE 186 2020    PROT 6.8 08/15/2020    LABALBU 3.4 08/15/2020    CALCIUM 8.2 2020    BILITOT 0.48 08/15/2020    ALKPHOS 111 08/15/2020    AST 38 08/15/2020    ALT 35 08/15/2020     BMP:    Lab Results   Component Value Date     08/19/2020    K 3.6 08/19/2020     08/19/2020    CO2 28 08/19/2020    BUN 9 08/19/2020    LABALBU 3.4 08/15/2020    CREATININE 0.75 08/19/2020    CALCIUM 8.2 08/19/2020    GFRAA >60 08/19/2020    LABGLOM >60 08/19/2020    GLUCOSE 186 08/19/2020     PT/INR:    Lab Results   Component Value Date    PROTIME 13.1 08/15/2020    INR 1.0 08/15/2020     PTT:  No results found for: APTT, PTT[APTT}    Physical Examination:        General appearance: alert, cooperative and no distress  Mental Status: oriented to person, place and time and normal affect  Abdomen: soft, nontender, nondistended, bowel sounds present, obese protuberant abdomen    Assessment:        Primary Problem  Acute pancreatitis     Active Hospital Problems    Diagnosis Date Noted    Pancreatic pseudocyst [K86.3] 08/18/2020    Ileus ([address] [K56.7]     Adynamic ileus ([address] [K56.0]     Abdominal pain, epigastric [R10.13]     Morbid obesity ([address] [E66.01]     Anxiety [F41.9]     Nausea [R11.0]     Acute pancreatitis [K85.90]     Alcohol abuse [F10.10]     Essential hypertension [I10]     COPD without exacerbation ([address] [J44.9]     Type 2 diabetes mellitus, without long-term current use of insulin ([address] [E11.9]      Past Medical History:   Diagnosis Date    COPD (chronic obstructive pulmonary disease) ([address]     Diabetes mellitus ([address]     Edema     Heart attack ([address]     History of diabetes mellitus, type II     Hypertension     Obesity         Plan:        1. Acute pancreatitis  1. Amylase, lipase normalized  2. Tolerating soft, low-fat diet  3. MRCP   1.  The more anterior 1.5 cm lesion seen within the pancreatic tail on recent    CT is compatible with an intrapancreatic splenule.  The more posterior 2 cm    lesion may also represent a splenule but is not well evaluated due to motion    artifact.  If desired, a heat-damaged RBC nuclear medicine scan can be    performed for further evaluation.  Otherwise, a follow-up pancreatic protocol    CT can be performed for further evaluation. 2. Peripancreatic edema consistent with acute pancreatitis. 3. No biliary ductal dilatation or choledocholithiasis. 4. Vascular and Gen Surgery following  2. Ileus likely 2/2 acute pancreatitis  1. Resolved    May D/C per GI. To follow-up 2-3 weeks  Will need CT with pancreatic protocol to further evaluate in near future  I have discussed the care of  this patient and I have examined the patient myselft and taken ros and hpi , including pertinent history and exam findings,  with the Nurse Practitioner   I have reviewed the key elements of all parts of the encounter with the Nurse Practitioner . I agree with the assessment, plan and orders as documented by the  NP.       Explained to the patient and d/W Nursing Staff  Will F/U with you  Please call or Page for any issues or change in status  Thanks    Electronically signed by KATELYN Rothman NP on 8/19/2020 at 2:13 PM

## 2020-08-19 NOTE — PROGRESS NOTES
Pt discharged to home in stable condition with [facility]  Discharge instructions given  Discharge checklist reviewed and completed with pt   Pt denies having any further questions at this time  Personal items given to patient at discharge  Patient/family state they have everything they were admitted with.

## 2020-08-19 NOTE — PLAN OF CARE
Problem: Pain:  Goal: Control of acute pain  Description: Control of acute pain  Outcome: Ongoing  Note: Pain level assessment complete. Patient educated on pain scale and control interventions  Morphine PRN pain medication given per patient request but advised to avoid taking if able d/t ileus  Patient instructed to call out with new onset of pain or unrelieved pain  Will continue to monitor. Problem: Falls - Risk of:  Goal: Will remain free from falls  Description: Will remain free from falls  Outcome: Ongoing  Note: Siderails up x 2  Hourly rounding  Call light in reach  Remains free from falls and accidental injury at this time   Floor free from obstacles  Bed is locked and in lowest position  Adequate lighting provided  Will continue to monitor. Problem:  Bowel/Gastric:  Goal: Bowel function will improve  Description: Bowel function will improve  Outcome: Ongoing

## 2020-08-19 NOTE — PROGRESS NOTES
General Surgery Progress Note       PATIENT NAME: Perla Underwood     TODAY'S DATE: 8/19/2020    Chief Complaint   Patient presents with    Abdominal Pain       SUBJECTIVE:    Pt seen and examined. No acute issues overnight. Denies nausea, vomiting, fevers, chills. Minimal abdominal pain whatsoever. Tolerating clear liquids.  + Bowel movement/flatus. ROS  Denies any fevers or chills  Denies any chest pain or palpitations  Denies any acute SOB or productive cough   Minimal Abd pain, denies any nausea or emesis  Denies any dysuria or frequency       OBJECTIVE:   Vitals:  /77   Pulse 65   Temp 97.5 °F (36.4 °C) (Oral)   Resp 16   Ht 5' 11\" (1.803 m)   Wt 281 lb (127.5 kg)   SpO2 97%   BMI 39.19 kg/m²      General: AOx3, NAD  Lungs: Symmetrical chest rise bilaterally, no audible wheezes or rhonchi  Heart: +S1S2  Abdomen: Softly distended, protuberant abdomen, nontender throughout, non-peritoneal  Extremity: moves all extremities x4, No edema    Data Review:    BMP:    Recent Labs     08/18/20  1720   BUN 11   CREATININE 0.80           ASSESSMENT     58-year-old male presented with severe pancreatitis likely secondary to alcohol abuse. ? Pancreatic lesions concerning for possible pseudoaneurysm/hematoma  Ileus resolved         Plan:  1. Advance diet as tolerated  2. MRCP reviewed. Do not see any gallstones obstructing the common duct or pancreatic duct. Awaiting radiology input regarding the pancreatic tail /official read. 3. Labs normalized  4. Ileus resolved. Having bowel movements and tolerating clears. 5. Recommend seeking treatment for alcohol abuse. 6. No interventions planned or indicated from a surgical standpoint at this time. Electronically signed by Kayode Solis DO  on 8/19/2020 at 5:39 AM     Attending Physician Statement  I have discussed the case, including pertinent history and exam findings with the resident.  I agree with the assessment, plan and orders as documented by

## 2020-08-19 NOTE — DISCHARGE SUMMARY
[facility]    Discharge Summary     Patient ID: Halina Sutherland  :  [id number]   MRN: [id number]     ACCOUNT:  [de-identified]   Patient's PCP: Smiley Bautista PA-C  Admit Date: 2020   Discharge Date: 2020     Length of Stay: 5  Code Status:  Full Code  Admitting Physician: Adam Woods DO  Discharge Physician: Conrad Bahena DO     Active Discharge Diagnoses:     Hospital Problem Lists:  Principal Problem:    Acute pancreatitis  Active Problems:    COPD without exacerbation ([address]    Essential hypertension    Type 2 diabetes mellitus, without long-term current use of insulin ([address]    Alcohol abuse    Abdominal pain, epigastric    Morbid obesity ([address]    Anxiety    Nausea    Ileus ([address]    Adynamic ileus ([address]    Pancreatic pseudocyst  Resolved Problems:    * No resolved hospital problems. *      Admission Condition:  fair     Discharged Condition: stable    Hospital Stay:     Hospital Course:  Halina Sutherland is a 76 y.o. male who was admitted for the management of  Acute pancreatitis , presented to ER with Abdominal Pain    This is a 70-year-old white male who is admitted with a complaint of upper abdominal pain and found to have acute pancreatitis mostly related to alcohol use. He has been in the hospital placed on IV fluids, analgesics, bowel rest and underwent GI and surgical consultations. His abdominal pain improved but he developed abdominal distention and was not passing flatus. Ultimately was found to have an adynamic ileus mostly related to his pancreatitis. There were a few cystic lesions noted in the tail the pancreas and an MRCP was completed which appeared to be benign splenules. Follow-up imaging at discretion of GI as an outpatient.       Significant therapeutic interventions: IV hydration, analgesics, bowel rest    Significant Diagnostic Studies:   Labs / Micro:  CBC:   Lab Results   Component Value Date    WBC 17.9 08/15/2020    RBC 4.98 08/15/2020    HGB 15.7 08/15/2020    HCT 48.2 08/15/2020    MCV 96.8 08/15/2020    MCH 31.5 08/15/2020    MCHC 32.6 08/15/2020    RDW 13.0 08/15/2020     08/15/2020     BMP:    Lab Results   Component Value Date    GLUCOSE 186 08/19/2020     08/19/2020    K 3.6 08/19/2020     08/19/2020    CO2 28 08/19/2020    ANIONGAP 11 08/19/2020    BUN 9 08/19/2020    CREATININE 0.75 08/19/2020    BUNCRER 12 08/19/2020    CALCIUM 8.2 08/19/2020    LABGLOM >60 08/19/2020    GFRAA >60 08/19/2020    GFR      08/19/2020    GFR NOT REPORTED 08/19/2020        Radiology:  Xr Abdomen (kub) (single Ap View)    Result Date: 8/17/2020  Similar mild ileus. Xr Abdomen (kub) (single Ap View)    Result Date: 8/17/2020  Similar appearance again demonstrating findings suggesting an ileus     Xr Abdomen (kub) (single Ap View)    Result Date: 8/16/2020  Probable developing adynamic ileus related to acute pancreatitis. Ct Abdomen Pelvis W Iv Contrast Additional Contrast? None    Result Date: 8/14/2020  1. Acute edematous interstitial pancreatitis. Peripancreatic inflammatory and edematous changes without discrete fluid collection. 2. Two adjacent globular moderately hyperdense pancreatic tail foci following attenuation of adjacent vessels measuring 1.5 cm and 2.0 cm. Appearance is concerning for a pseudoaneurysm with possible adjacent hematoma. This would be better assessed with a multiphasic contrast enhanced exam. 3. A 5.7 cm left renal cyst.     Mri Abdomen W Wo Contrast Mrcp    Result Date: 8/19/2020  1. The more anterior 1.5 cm lesion seen within the pancreatic tail on recent CT is compatible with an intrapancreatic splenule. The more posterior 2 cm lesion may also represent a splenule but is not well evaluated due to motion artifact. If desired, a heat-damaged RBC nuclear medicine scan can be performed for further evaluation.   Otherwise, a follow-up pancreatic protocol CT can be performed DO  8/19/2020  11:51 AM      Thank you Dr. Mitzy Guzman PA-C, PA-C for the opportunity to be involved in this patient's care.

## 2020-08-19 NOTE — PROGRESS NOTES
[facility]    Progress Note    8/19/2020    7:57 AM    Name:   Timo Darden  MRN:     [id number]     Acct:      [de-identified]   Room:   2003/2003-02  IP Day:  5  Admit Date:  8/14/2020  2:33 PM    PCP:   Lakeshia Zamora PA-C, PA-C  Code Status:  Full Code    Subjective:     C/C:   Chief Complaint   Patient presents with    Abdominal Pain     Interval History Status: improved. Patient status been advanced this morning. Denies any complaints of chest pain, shortness of breath, nausea or vomiting. Positive BM and passing flatus. Brief History: This is a 77-year-old male who presents with a complaint of severe upper abdominal pain and was found to have evidence of acute pancreatitis. Pancreatitis most likely related to his daily consumption of alcohol has been improving with bowel rest.  He has developed an ileus associated with the symptoms and continues to have abdominal distention which is slowly improving. Review of Systems:     Constitutional:  negative for chills, fevers, sweats  Respiratory:  negative for cough, dyspnea on exertion, shortness of breath, wheezing  Cardiovascular:  negative for chest pain, chest pressure/discomfort, lower extremity edema, palpitations  Gastrointestinal:  negative for abdominal pain, constipation, diarrhea, nausea, vomiting  Neurological:  negative for dizziness, headache    Medications:      Allergies:  No Known Allergies    Current Meds:   Scheduled Meds:    methadone  50 mg Oral Daily    polyethylene glycol  17 g Oral Daily    bisacodyl  10 mg Rectal Daily    metFORMIN  500 mg Oral BID WC    sodium chloride flush  10 mL Intravenous 2 times per day    insulin lispro  0-12 Units Subcutaneous TID WC    insulin lispro  0-6 Units Subcutaneous Nightly    lisinopril  40 mg Oral Daily    famotidine (PEPCID) injection  20 mg Intravenous BID    enoxaparin  30 mg Subcutaneous BID    multivitamin  1 tablet Oral Daily    carvedilol  6.25 mg Oral BID     Continuous Infusions:    dextrose      lactated ringers 50 mL/hr at 20     PRN Meds: fleet, albuterol sulfate HFA, hydrALAZINE, morphine **OR** morphine, sodium chloride flush, glucose, dextrose, glucagon (rDNA), dextrose, potassium chloride, magnesium sulfate, acetaminophen, promethazine **OR** ondansetron, LORazepam **OR** LORazepam **OR** LORazepam **OR** LORazepam **OR** LORazepam **OR** LORazepam **OR** LORazepam **OR** LORazepam    Data:     Past Medical History:   has a past medical history of COPD (chronic obstructive pulmonary disease) ([facility] [address] Diabetes mellitus [facility] Edema, Heart attack ([facility] History of diabetes mellitus, type II, Hypertension, and Obesity. Social History:   reports that he has quit smoking. He has never used smokeless tobacco. He reports current alcohol use. He reports that he does not use drugs. Family History:   Family History   Problem Relation Age of Onset    No Known Problems Mother     Diabetes Father     Heart Disease Father     Heart Disease Paternal Grandfather        Vitals:  /77   Pulse 65   Temp 97.5 °F (36.4 °C) (Oral)   Resp 16   Ht 5' 11\" (1.803 m)   Wt 281 lb (127.5 kg)   SpO2 97%   BMI 39.19 kg/m²   Temp (24hrs), Av °F (36.7 °C), Min:97.5 °F (36.4 °C), Max:98.4 °F (36.9 °C)    Recent Labs     20  1106 20  1606 20  1955 20  0614   POCGLU 173* 173* 196* 190*       I/O (24Hr): No intake or output data in the 24 hours ending 20 0757    Labs:  Hematology:No results for input(s): WBC, RBC, HGB, HCT, MCV, MCH, MCHC, RDW, PLT, MPV, SEDRATE, CRP, INR, DDIMER, MP1EUIZL, LABABSO in the last 72 hours.     Invalid input(s): PT  Chemistry:  Recent Labs     20  1720 20  0621   NA  --  139   K  --  3.6*   CL  --  100   CO2  --  28   GLUCOSE  --  186*   BUN 11 9   CREATININE 0.80 0.75   ANIONGAP  --  11   LABGLOM >60 >60   GFRAA >60 >60   CALCIUM  -- 8.2*     Recent Labs     08/16/20  1411  08/17/20  0500  08/17/20  1959 08/18/20  0615 08/18/20  1106 08/18/20  1606 08/18/20 1955 08/19/20  0614   AMYLASE 36  --  18*  --   --   --   --   --   --   --    LIPASE 57  --  30  --   --   --   --   --   --   --    POCGLU  --    < >  --    < > 152* 143* 173* 173* 196* 190*    < > = values in this interval not displayed. ABG:No results found for: POCPH, PHART, PH, POCPCO2, CGP2OJN, PCO2, POCPO2, PO2ART, PO2, POCHCO3, IPE7MUX, HCO3, NBEA, PBEA, BEART, BE, THGBART, THB, QRC7MGO, KNMO5RDX, M0NTHZMN, O2SAT, FIO2  Lab Results   Component Value Date/Time    SPECIAL LT ARM 8ML 12/06/2019 02:48 AM     Lab Results   Component Value Date/Time    CULTURE NO GROWTH 6 DAYS 12/06/2019 02:48 AM       Radiology:  Yoselin Andi Abdomen (kub) (single Ap View)    Result Date: 8/17/2020  Similar mild ileus. Xr Abdomen (kub) (single Ap View)    Result Date: 8/17/2020  Similar appearance again demonstrating findings suggesting an ileus     Xr Abdomen (kub) (single Ap View)    Result Date: 8/16/2020  Probable developing adynamic ileus related to acute pancreatitis. Ct Abdomen Pelvis W Iv Contrast Additional Contrast? None    Result Date: 8/14/2020  1. Acute edematous interstitial pancreatitis. Peripancreatic inflammatory and edematous changes without discrete fluid collection. 2. Two adjacent globular moderately hyperdense pancreatic tail foci following attenuation of adjacent vessels measuring 1.5 cm and 2.0 cm. Appearance is concerning for a pseudoaneurysm with possible adjacent hematoma. This would be better assessed with a multiphasic contrast enhanced exam. 3. A 5.7 cm left renal cyst.     Mri Abdomen W Wo Contrast Mrcp    Result Date: 8/19/2020  1. The more anterior 1.5 cm lesion seen within the pancreatic tail on recent CT is compatible with an intrapancreatic splenule. The more posterior 2 cm lesion may also represent a splenule but is not well evaluated due to motion artifact.   If desired, a heat-damaged RBC nuclear medicine scan can be performed for further evaluation. Otherwise, a follow-up pancreatic protocol CT can be performed for further evaluation. 2. Peripancreatic edema consistent with acute pancreatitis. 3. No biliary ductal dilatation or choledocholithiasis. Physical Examination:        General appearance:  alert, cooperative and no distress  Mental Status:  oriented to person, place and time and normal affect  Lungs:  clear to auscultation bilaterally, normal effort  Heart:  regular rate and rhythm, no murmur  Abdomen:  soft, nontender, nondistended, normal bowel sounds, no masses, hepatomegaly, splenomegaly  Extremities:  no edema, redness, tenderness in the calves  Skin:  no gross lesions, rashes, induration    Assessment:        Hospital Problems           Last Modified POA    * (Principal) Acute pancreatitis 8/14/2020 Yes    COPD without exacerbation ([address] 8/18/2020 Yes    Essential hypertension 8/14/2020 Yes    Type 2 diabetes mellitus, without long-term current use of insulin ([address] 8/14/2020 Yes    Alcohol abuse 8/14/2020 Yes    Abdominal pain, epigastric 8/15/2020 Yes    Morbid obesity ([address] 8/15/2020 Yes    Anxiety 8/15/2020 Yes    Nausea 8/15/2020 Yes    Ileus ([address] 8/17/2020 Yes    Adynamic ileus ([address] 8/17/2020 Yes    Pancreatic pseudocyst 8/18/2020 Yes          Plan:        1. Advance diet as tolerated  2. MRI reviewed, discussed with patient. May need further imaging as an outpatient  3. Alcohol cessation  4. Oxygen and aerosols as needed  5. Continue present antihypertensives  6. Activity as tolerated  7. Insulin scale as ordered  8.  Discharge home    Mitra Baker DO  8/19/2020  7:57 AM

## 2021-03-07 ENCOUNTER — HOSPITAL ENCOUNTER (INPATIENT)
Age: 66
LOS: 1 days | Discharge: LEFT BEFORE DISCHARGE | DRG: 282 | End: 2021-03-08
Attending: EMERGENCY MEDICINE | Admitting: FAMILY MEDICINE
Payer: MEDICARE

## 2021-03-07 ENCOUNTER — APPOINTMENT (OUTPATIENT)
Dept: GENERAL RADIOLOGY | Age: 66
DRG: 282 | End: 2021-03-07
Payer: MEDICARE

## 2021-03-07 DIAGNOSIS — I21.4 NSTEMI (NON-ST ELEVATED MYOCARDIAL INFARCTION) (HCC): Primary | ICD-10-CM

## 2021-03-07 LAB
ABSOLUTE EOS #: 0.64 K/UL (ref 0–0.44)
ABSOLUTE IMMATURE GRANULOCYTE: 0.03 K/UL (ref 0–0.3)
ABSOLUTE LYMPH #: 1.86 K/UL (ref 1.1–3.7)
ABSOLUTE MONO #: 0.63 K/UL (ref 0.1–1.2)
ANION GAP SERPL CALCULATED.3IONS-SCNC: 12 MMOL/L (ref 9–17)
BASOPHILS # BLD: 0 % (ref 0–2)
BASOPHILS ABSOLUTE: 0.03 K/UL (ref 0–0.2)
BUN BLDV-MCNC: 12 MG/DL (ref 8–23)
BUN/CREAT BLD: 13 (ref 9–20)
CALCIUM SERPL-MCNC: 9.1 MG/DL (ref 8.6–10.4)
CHLORIDE BLD-SCNC: 97 MMOL/L (ref 98–107)
CO2: 25 MMOL/L (ref 20–31)
CREAT SERPL-MCNC: 0.92 MG/DL (ref 0.7–1.2)
D-DIMER QUANTITATIVE: 1.18 MG/L FEU (ref 0–0.59)
DIFFERENTIAL TYPE: ABNORMAL
EOSINOPHILS RELATIVE PERCENT: 7 % (ref 1–4)
GFR AFRICAN AMERICAN: >60 ML/MIN
GFR NON-AFRICAN AMERICAN: >60 ML/MIN
GFR SERPL CREATININE-BSD FRML MDRD: ABNORMAL ML/MIN/{1.73_M2}
GFR SERPL CREATININE-BSD FRML MDRD: ABNORMAL ML/MIN/{1.73_M2}
GLUCOSE BLD-MCNC: 439 MG/DL (ref 70–99)
HCT VFR BLD CALC: 47.4 % (ref 40.7–50.3)
HEMOGLOBIN: 14.9 G/DL (ref 13–17)
IMMATURE GRANULOCYTES: 0 %
LACTIC ACID: 2.8 MMOL/L (ref 0.5–2.2)
LYMPHOCYTES # BLD: 20 % (ref 24–43)
MCH RBC QN AUTO: 29.4 PG (ref 25.2–33.5)
MCHC RBC AUTO-ENTMCNC: 31.4 G/DL (ref 28.4–34.8)
MCV RBC AUTO: 93.5 FL (ref 82.6–102.9)
MONOCYTES # BLD: 7 % (ref 3–12)
NRBC AUTOMATED: 0 PER 100 WBC
PDW BLD-RTO: 13.1 % (ref 11.8–14.4)
PLATELET # BLD: 185 K/UL (ref 138–453)
PLATELET ESTIMATE: ABNORMAL
PMV BLD AUTO: 11.3 FL (ref 8.1–13.5)
POTASSIUM SERPL-SCNC: 4.2 MMOL/L (ref 3.7–5.3)
RBC # BLD: 5.07 M/UL (ref 4.21–5.77)
RBC # BLD: ABNORMAL 10*6/UL
SEG NEUTROPHILS: 66 % (ref 36–65)
SEGMENTED NEUTROPHILS ABSOLUTE COUNT: 6.01 K/UL (ref 1.5–8.1)
SODIUM BLD-SCNC: 134 MMOL/L (ref 135–144)
TROPONIN INTERP: ABNORMAL
TROPONIN T: ABNORMAL NG/ML
TROPONIN, HIGH SENSITIVITY: 58 NG/L (ref 0–22)
WBC # BLD: 9.2 K/UL (ref 3.5–11.3)
WBC # BLD: ABNORMAL 10*3/UL

## 2021-03-07 PROCEDURE — 99284 EMERGENCY DEPT VISIT MOD MDM: CPT

## 2021-03-07 PROCEDURE — 93005 ELECTROCARDIOGRAM TRACING: CPT | Performed by: EMERGENCY MEDICINE

## 2021-03-07 PROCEDURE — 84484 ASSAY OF TROPONIN QUANT: CPT

## 2021-03-07 PROCEDURE — 80048 BASIC METABOLIC PNL TOTAL CA: CPT

## 2021-03-07 PROCEDURE — 71045 X-RAY EXAM CHEST 1 VIEW: CPT

## 2021-03-07 PROCEDURE — 85025 COMPLETE CBC W/AUTO DIFF WBC: CPT

## 2021-03-07 PROCEDURE — 85379 FIBRIN DEGRADATION QUANT: CPT

## 2021-03-07 PROCEDURE — 96374 THER/PROPH/DIAG INJ IV PUSH: CPT

## 2021-03-07 PROCEDURE — 83605 ASSAY OF LACTIC ACID: CPT

## 2021-03-08 ENCOUNTER — APPOINTMENT (OUTPATIENT)
Dept: CT IMAGING | Age: 66
DRG: 282 | End: 2021-03-08
Payer: MEDICARE

## 2021-03-08 VITALS
DIASTOLIC BLOOD PRESSURE: 69 MMHG | OXYGEN SATURATION: 95 % | RESPIRATION RATE: 16 BRPM | WEIGHT: 201.7 LBS | TEMPERATURE: 97.9 F | HEIGHT: 71 IN | BODY MASS INDEX: 28.24 KG/M2 | SYSTOLIC BLOOD PRESSURE: 147 MMHG | HEART RATE: 78 BPM

## 2021-03-08 PROBLEM — L03.116 CELLULITIS OF LEFT LOWER EXTREMITY: Status: RESOLVED | Noted: 2019-12-05 | Resolved: 2021-03-08

## 2021-03-08 PROBLEM — F19.10 MULTIPLE SUBSTANCE ABUSE (HCC): Status: ACTIVE | Noted: 2021-03-08

## 2021-03-08 PROBLEM — I21.4 NSTEMI (NON-ST ELEVATED MYOCARDIAL INFARCTION) (HCC): Status: ACTIVE | Noted: 2021-03-08

## 2021-03-08 LAB
AMPHETAMINE SCREEN URINE: NEGATIVE
ANTI-XA UNFRAC HEPARIN: <0.1 IU/L (ref 0.3–0.7)
BARBITURATE SCREEN URINE: NEGATIVE
BENZODIAZEPINE SCREEN, URINE: NEGATIVE
BUPRENORPHINE URINE: ABNORMAL
CANNABINOID SCREEN URINE: NEGATIVE
COCAINE METABOLITE, URINE: NEGATIVE
EKG ATRIAL RATE: 100 BPM
EKG ATRIAL RATE: 92 BPM
EKG P AXIS: 65 DEGREES
EKG P AXIS: 67 DEGREES
EKG P-R INTERVAL: 204 MS
EKG P-R INTERVAL: 206 MS
EKG Q-T INTERVAL: 366 MS
EKG Q-T INTERVAL: 380 MS
EKG QRS DURATION: 128 MS
EKG QRS DURATION: 132 MS
EKG QTC CALCULATION (BAZETT): 469 MS
EKG QTC CALCULATION (BAZETT): 472 MS
EKG R AXIS: 49 DEGREES
EKG R AXIS: 53 DEGREES
EKG T AXIS: 73 DEGREES
EKG T AXIS: 81 DEGREES
EKG VENTRICULAR RATE: 100 BPM
EKG VENTRICULAR RATE: 92 BPM
HCT VFR BLD CALC: 44.6 % (ref 40.7–50.3)
HCT VFR BLD CALC: 46.7 % (ref 40.7–50.3)
HEMOGLOBIN: 14.5 G/DL (ref 13–17)
HEMOGLOBIN: 14.9 G/DL (ref 13–17)
INR BLD: 1.1
INR BLD: 1.1
MCH RBC QN AUTO: 29.7 PG (ref 25.2–33.5)
MCH RBC QN AUTO: 30.2 PG (ref 25.2–33.5)
MCHC RBC AUTO-ENTMCNC: 31.9 G/DL (ref 28.4–34.8)
MCHC RBC AUTO-ENTMCNC: 32.5 G/DL (ref 28.4–34.8)
MCV RBC AUTO: 92.9 FL (ref 82.6–102.9)
MCV RBC AUTO: 93 FL (ref 82.6–102.9)
MDMA URINE: ABNORMAL
METHADONE SCREEN, URINE: POSITIVE
METHAMPHETAMINE, URINE: ABNORMAL
NRBC AUTOMATED: 0 PER 100 WBC
NRBC AUTOMATED: 0 PER 100 WBC
OPIATES, URINE: NEGATIVE
OXYCODONE SCREEN URINE: NEGATIVE
PARTIAL THROMBOPLASTIN TIME: 112.7 SEC (ref 23.9–33.8)
PARTIAL THROMBOPLASTIN TIME: 36 SEC (ref 23.9–33.8)
PDW BLD-RTO: 13.1 % (ref 11.8–14.4)
PDW BLD-RTO: 13.2 % (ref 11.8–14.4)
PHENCYCLIDINE, URINE: NEGATIVE
PLATELET # BLD: 207 K/UL (ref 138–453)
PLATELET # BLD: 236 K/UL (ref 138–453)
PMV BLD AUTO: 11.1 FL (ref 8.1–13.5)
PMV BLD AUTO: 11.3 FL (ref 8.1–13.5)
PROPOXYPHENE, URINE: ABNORMAL
PROTHROMBIN TIME: 14.3 SEC (ref 11.5–14.2)
PROTHROMBIN TIME: 14.4 SEC (ref 11.5–14.2)
RBC # BLD: 4.8 M/UL (ref 4.21–5.77)
RBC # BLD: 5.02 M/UL (ref 4.21–5.77)
SARS-COV-2, RAPID: NOT DETECTED
SPECIMEN DESCRIPTION: NORMAL
TEST INFORMATION: ABNORMAL
TRICYCLIC ANTIDEPRESSANTS, UR: ABNORMAL
TROPONIN INTERP: ABNORMAL
TROPONIN T: ABNORMAL NG/ML
TROPONIN, HIGH SENSITIVITY: 188 NG/L (ref 0–22)
TROPONIN, HIGH SENSITIVITY: 208 NG/L (ref 0–22)
TROPONIN, HIGH SENSITIVITY: 228 NG/L (ref 0–22)
TROPONIN, HIGH SENSITIVITY: 236 NG/L (ref 0–22)
TROPONIN, HIGH SENSITIVITY: 242 NG/L (ref 0–22)
WBC # BLD: 10.1 K/UL (ref 3.5–11.3)
WBC # BLD: 9.2 K/UL (ref 3.5–11.3)

## 2021-03-08 PROCEDURE — 6360000002 HC RX W HCPCS: Performed by: EMERGENCY MEDICINE

## 2021-03-08 PROCEDURE — 85520 HEPARIN ASSAY: CPT

## 2021-03-08 PROCEDURE — 71260 CT THORAX DX C+: CPT

## 2021-03-08 PROCEDURE — 85027 COMPLETE CBC AUTOMATED: CPT

## 2021-03-08 PROCEDURE — 85730 THROMBOPLASTIN TIME PARTIAL: CPT

## 2021-03-08 PROCEDURE — 80307 DRUG TEST PRSMV CHEM ANLYZR: CPT

## 2021-03-08 PROCEDURE — 99222 1ST HOSP IP/OBS MODERATE 55: CPT | Performed by: FAMILY MEDICINE

## 2021-03-08 PROCEDURE — 84484 ASSAY OF TROPONIN QUANT: CPT

## 2021-03-08 PROCEDURE — 2580000003 HC RX 258: Performed by: EMERGENCY MEDICINE

## 2021-03-08 PROCEDURE — 36415 COLL VENOUS BLD VENIPUNCTURE: CPT

## 2021-03-08 PROCEDURE — U0002 COVID-19 LAB TEST NON-CDC: HCPCS

## 2021-03-08 PROCEDURE — 93010 ELECTROCARDIOGRAM REPORT: CPT | Performed by: INTERNAL MEDICINE

## 2021-03-08 PROCEDURE — 6360000004 HC RX CONTRAST MEDICATION: Performed by: EMERGENCY MEDICINE

## 2021-03-08 PROCEDURE — 93005 ELECTROCARDIOGRAM TRACING: CPT | Performed by: EMERGENCY MEDICINE

## 2021-03-08 PROCEDURE — 2060000000 HC ICU INTERMEDIATE R&B

## 2021-03-08 PROCEDURE — 6360000002 HC RX W HCPCS: Performed by: NURSE PRACTITIONER

## 2021-03-08 PROCEDURE — 6370000000 HC RX 637 (ALT 250 FOR IP): Performed by: EMERGENCY MEDICINE

## 2021-03-08 PROCEDURE — 85610 PROTHROMBIN TIME: CPT

## 2021-03-08 PROCEDURE — 6370000000 HC RX 637 (ALT 250 FOR IP): Performed by: NURSE PRACTITIONER

## 2021-03-08 RX ORDER — HEPARIN SODIUM 10000 [USP'U]/100ML
454-1000 INJECTION, SOLUTION INTRAVENOUS CONTINUOUS
Status: DISCONTINUED | OUTPATIENT
Start: 2021-03-08 | End: 2021-03-08 | Stop reason: HOSPADM

## 2021-03-08 RX ORDER — POTASSIUM CHLORIDE 20 MEQ/1
40 TABLET, EXTENDED RELEASE ORAL PRN
Status: DISCONTINUED | OUTPATIENT
Start: 2021-03-08 | End: 2021-03-08 | Stop reason: HOSPADM

## 2021-03-08 RX ORDER — LISINOPRIL 40 MG/1
40 TABLET ORAL DAILY
Status: DISCONTINUED | OUTPATIENT
Start: 2021-03-08 | End: 2021-03-08 | Stop reason: HOSPADM

## 2021-03-08 RX ORDER — SODIUM CHLORIDE 0.9 % (FLUSH) 0.9 %
10 SYRINGE (ML) INJECTION PRN
Status: DISCONTINUED | OUTPATIENT
Start: 2021-03-08 | End: 2021-03-08 | Stop reason: SDUPTHER

## 2021-03-08 RX ORDER — HEPARIN SODIUM 1000 [USP'U]/ML
4000 INJECTION, SOLUTION INTRAVENOUS; SUBCUTANEOUS PRN
Status: DISCONTINUED | OUTPATIENT
Start: 2021-03-08 | End: 2021-03-08 | Stop reason: HOSPADM

## 2021-03-08 RX ORDER — HEPARIN SODIUM 10000 [USP'U]/100ML
454-1000 INJECTION, SOLUTION INTRAVENOUS CONTINUOUS
Status: DISCONTINUED | OUTPATIENT
Start: 2021-03-08 | End: 2021-03-08 | Stop reason: SDUPTHER

## 2021-03-08 RX ORDER — 0.9 % SODIUM CHLORIDE 0.9 %
80 INTRAVENOUS SOLUTION INTRAVENOUS ONCE
Status: COMPLETED | OUTPATIENT
Start: 2021-03-08 | End: 2021-03-08

## 2021-03-08 RX ORDER — ACETAMINOPHEN 325 MG/1
650 TABLET ORAL EVERY 6 HOURS PRN
Status: DISCONTINUED | OUTPATIENT
Start: 2021-03-08 | End: 2021-03-08 | Stop reason: HOSPADM

## 2021-03-08 RX ORDER — HEPARIN SODIUM 1000 [USP'U]/ML
4000 INJECTION, SOLUTION INTRAVENOUS; SUBCUTANEOUS PRN
Status: DISCONTINUED | OUTPATIENT
Start: 2021-03-08 | End: 2021-03-08 | Stop reason: SDUPTHER

## 2021-03-08 RX ORDER — 0.9 % SODIUM CHLORIDE 0.9 %
500 INTRAVENOUS SOLUTION INTRAVENOUS ONCE
Status: COMPLETED | OUTPATIENT
Start: 2021-03-08 | End: 2021-03-08

## 2021-03-08 RX ORDER — METHADONE HYDROCHLORIDE 10 MG/5ML
18 SOLUTION ORAL ONCE
Status: COMPLETED | OUTPATIENT
Start: 2021-03-08 | End: 2021-03-08

## 2021-03-08 RX ORDER — HEPARIN SODIUM 1000 [USP'U]/ML
60 INJECTION, SOLUTION INTRAVENOUS; SUBCUTANEOUS ONCE
Status: DISCONTINUED | OUTPATIENT
Start: 2021-03-08 | End: 2021-03-08 | Stop reason: SDUPTHER

## 2021-03-08 RX ORDER — ATORVASTATIN CALCIUM 80 MG/1
80 TABLET, FILM COATED ORAL NIGHTLY
Status: DISCONTINUED | OUTPATIENT
Start: 2021-03-08 | End: 2021-03-08 | Stop reason: HOSPADM

## 2021-03-08 RX ORDER — POTASSIUM CHLORIDE 7.45 MG/ML
10 INJECTION INTRAVENOUS PRN
Status: DISCONTINUED | OUTPATIENT
Start: 2021-03-08 | End: 2021-03-08 | Stop reason: HOSPADM

## 2021-03-08 RX ORDER — HEPARIN SODIUM 1000 [USP'U]/ML
4000 INJECTION, SOLUTION INTRAVENOUS; SUBCUTANEOUS ONCE
Status: COMPLETED | OUTPATIENT
Start: 2021-03-08 | End: 2021-03-08

## 2021-03-08 RX ORDER — HEPARIN SODIUM 1000 [USP'U]/ML
2000 INJECTION, SOLUTION INTRAVENOUS; SUBCUTANEOUS PRN
Status: DISCONTINUED | OUTPATIENT
Start: 2021-03-08 | End: 2021-03-08 | Stop reason: HOSPADM

## 2021-03-08 RX ORDER — HEPARIN SODIUM 1000 [USP'U]/ML
2000 INJECTION, SOLUTION INTRAVENOUS; SUBCUTANEOUS PRN
Status: DISCONTINUED | OUTPATIENT
Start: 2021-03-08 | End: 2021-03-08 | Stop reason: SDUPTHER

## 2021-03-08 RX ORDER — MULTIVITAMIN WITH IRON
1 TABLET ORAL DAILY
Status: DISCONTINUED | OUTPATIENT
Start: 2021-03-08 | End: 2021-03-08 | Stop reason: HOSPADM

## 2021-03-08 RX ORDER — ASPIRIN 81 MG/1
324 TABLET, CHEWABLE ORAL ONCE
Status: COMPLETED | OUTPATIENT
Start: 2021-03-08 | End: 2021-03-08

## 2021-03-08 RX ORDER — SODIUM CHLORIDE 0.9 % (FLUSH) 0.9 %
10 SYRINGE (ML) INJECTION PRN
Status: DISCONTINUED | OUTPATIENT
Start: 2021-03-08 | End: 2021-03-08 | Stop reason: HOSPADM

## 2021-03-08 RX ORDER — ONDANSETRON 2 MG/ML
4 INJECTION INTRAMUSCULAR; INTRAVENOUS EVERY 6 HOURS PRN
Status: DISCONTINUED | OUTPATIENT
Start: 2021-03-08 | End: 2021-03-08 | Stop reason: HOSPADM

## 2021-03-08 RX ORDER — NITROGLYCERIN 0.4 MG/1
0.4 TABLET SUBLINGUAL EVERY 5 MIN PRN
Status: DISCONTINUED | OUTPATIENT
Start: 2021-03-08 | End: 2021-03-08 | Stop reason: HOSPADM

## 2021-03-08 RX ORDER — MAGNESIUM SULFATE 1 G/100ML
1000 INJECTION INTRAVENOUS PRN
Status: DISCONTINUED | OUTPATIENT
Start: 2021-03-08 | End: 2021-03-08 | Stop reason: HOSPADM

## 2021-03-08 RX ORDER — CARVEDILOL 6.25 MG/1
6.25 TABLET ORAL 2 TIMES DAILY
Status: DISCONTINUED | OUTPATIENT
Start: 2021-03-08 | End: 2021-03-08 | Stop reason: HOSPADM

## 2021-03-08 RX ORDER — SODIUM CHLORIDE 0.9 % (FLUSH) 0.9 %
10 SYRINGE (ML) INJECTION EVERY 12 HOURS SCHEDULED
Status: DISCONTINUED | OUTPATIENT
Start: 2021-03-08 | End: 2021-03-08 | Stop reason: HOSPADM

## 2021-03-08 RX ORDER — ASPIRIN 81 MG/1
81 TABLET, CHEWABLE ORAL DAILY
Status: DISCONTINUED | OUTPATIENT
Start: 2021-03-09 | End: 2021-03-08 | Stop reason: HOSPADM

## 2021-03-08 RX ORDER — FAMOTIDINE 20 MG/1
20 TABLET, FILM COATED ORAL 2 TIMES DAILY
Status: DISCONTINUED | OUTPATIENT
Start: 2021-03-08 | End: 2021-03-08 | Stop reason: HOSPADM

## 2021-03-08 RX ORDER — ACETAMINOPHEN 650 MG/1
650 SUPPOSITORY RECTAL EVERY 6 HOURS PRN
Status: DISCONTINUED | OUTPATIENT
Start: 2021-03-08 | End: 2021-03-08 | Stop reason: HOSPADM

## 2021-03-08 RX ORDER — PROMETHAZINE HYDROCHLORIDE 12.5 MG/1
12.5 TABLET ORAL EVERY 6 HOURS PRN
Status: DISCONTINUED | OUTPATIENT
Start: 2021-03-08 | End: 2021-03-08 | Stop reason: HOSPADM

## 2021-03-08 RX ORDER — FUROSEMIDE 40 MG/1
40 TABLET ORAL DAILY
Status: DISCONTINUED | OUTPATIENT
Start: 2021-03-08 | End: 2021-03-08 | Stop reason: HOSPADM

## 2021-03-08 RX ADMIN — METHADONE HYDROCHLORIDE 18 MG: 10 SOLUTION ORAL at 03:14

## 2021-03-08 RX ADMIN — SODIUM CHLORIDE 500 ML: 9 INJECTION, SOLUTION INTRAVENOUS at 01:25

## 2021-03-08 RX ADMIN — ASPIRIN 324 MG: 81 TABLET, CHEWABLE ORAL at 01:55

## 2021-03-08 RX ADMIN — HEPARIN SODIUM AND DEXTROSE 11.03 UNITS/KG/HR: 10000; 5 INJECTION INTRAVENOUS at 03:51

## 2021-03-08 RX ADMIN — SODIUM CHLORIDE 80 ML: 9 INJECTION, SOLUTION INTRAVENOUS at 01:45

## 2021-03-08 RX ADMIN — LISINOPRIL 40 MG: 40 TABLET ORAL at 09:21

## 2021-03-08 RX ADMIN — IOPAMIDOL 75 ML: 755 INJECTION, SOLUTION INTRAVENOUS at 01:45

## 2021-03-08 RX ADMIN — HEPARIN SODIUM AND DEXTROSE 11.03 UNITS/KG/HR: 10000; 5 INJECTION INTRAVENOUS at 04:45

## 2021-03-08 RX ADMIN — Medication 10 ML: at 01:45

## 2021-03-08 RX ADMIN — HEPARIN SODIUM AND DEXTROSE 8.03 UNITS/KG/HR: 10000; 5 INJECTION INTRAVENOUS at 08:35

## 2021-03-08 RX ADMIN — INSULIN HUMAN 8 UNITS: 100 INJECTION, SOLUTION PARENTERAL at 01:25

## 2021-03-08 RX ADMIN — HEPARIN SODIUM 4000 UNITS: 1000 INJECTION INTRAVENOUS; SUBCUTANEOUS at 03:51

## 2021-03-08 ASSESSMENT — ENCOUNTER SYMPTOMS
VOICE CHANGE: 0
ABDOMINAL PAIN: 0
SHORTNESS OF BREATH: 1
NAUSEA: 0
VOMITING: 0
CONSTIPATION: 0
SHORTNESS OF BREATH: 0
SINUS PAIN: 0
SINUS PRESSURE: 0
RHINORRHEA: 0
APNEA: 0
BACK PAIN: 0
COUGH: 0
CHOKING: 0
DIARRHEA: 0
EYES NEGATIVE: 1
COLOR CHANGE: 0
WHEEZING: 0
ABDOMINAL DISTENTION: 0
CHEST TIGHTNESS: 0

## 2021-03-08 ASSESSMENT — PAIN SCALES - GENERAL: PAINLEVEL_OUTOF10: 0

## 2021-03-08 NOTE — PROGRESS NOTES
Rounded with Dr Selma Monk. Pt states that he has not had chest pain, and takes no medications at home, besides an inhaler. States he has been snorting cocaine and heroin daily for 40 years, and that's not why he is here, or why his numbers are elevated. Dr Selma Monk gave his card and stated for the pt to follow up if needed upon discharge.

## 2021-03-08 NOTE — PLAN OF CARE
Monitored SaO2 > 90%. Applied 02 per nasal cannula as needed. Elevated HOB to improve breathing as needed.

## 2021-03-08 NOTE — CARE COORDINATION
Advance Care Planning     Advance Care Planning Activator (Inpatient)  Conversation Note      Date of ACP Conversation: 3/7/2021    Conversation Conducted with: Patient with Decision Making Capacity    ACP Activator: [id number] [facility] Decision Maker:     Current Designated Health Care Decision Maker:     Click here to complete Healthcare Decision Makers including section of the Healthcare Decision Maker Relationship (ie \"Primary\")  Today we documented Decision Maker(s) consistent with Legal Next of Kin hierarchy. Wife Sarina Way [phone]    Care Preferences    Ventilation: \"If you were in your present state of health and suddenly became very ill and were unable to breathe on your own, what would your preference be about the use of a ventilator (breathing machine) if it were available to you? \"      Would the patient desire the use of ventilator (breathing machine)?: yes    \"If your health worsens and it becomes clear that your chance of recovery is unlikely, what would your preference be about the use of a ventilator (breathing machine) if it were available to you? \"     Would the patient desire the use of ventilator (breathing machine)?: Yes      Resuscitation  \"CPR works best to restart the heart when there is a sudden event, like a heart attack, in someone who is otherwise healthy. Unfortunately, CPR does not typically restart the heart for people who have serious health conditions or who are very sick. \"    \"In the event your heart stopped as a result of an underlying serious health condition, would you want attempts to be made to restart your heart (answer \"yes\" for attempt to resuscitate) or would you prefer a natural death (answer \"no\" for do not attempt to resuscitate)? \" yes       [] Yes   [] No   Educated Patient / Zamzam Granda regarding differences between Advance Directives and portable DNR orders.     Length of ACP Conversation in minutes:      Conversation Outcomes:  [x] ACP discussion completed  [] Existing advance directive reviewed with patient; no changes to patient's previously recorded wishes  [] New Advance Directive completed  [] Portable Do Not Rescitate prepared for Provider review and signature  [] POLST/POST/MOLST/MOST prepared for Provider review and signature      Follow-up plan:    [x] Schedule follow-up conversation to continue planning  [] Referred individual to Provider for additional questions/concerns   [] Advised patient/agent/surrogate to review completed ACP document and update if needed with changes in condition, patient preferences or care setting    [] This note routed to one or more involved healthcare providers

## 2021-03-08 NOTE — DISCHARGE SUMMARY
(non-ST elevated myocardial infarction) ([facility] [address] , presented to ER with Drug Overdose and Shortness of Breath  Patient was brought to ED after his wife called EMS for patient having breathing difficulty. Patient had used  cocaine and methadone. On arrival of EMS at home , patient was diaphoretic and was in acute distress. Patient was placed on Non rebreather. Patient was found to have blood pressure 132/82, temperature 97.5, heart rate 100 BPM.  Lab evaluation showed troponin 228, Na 134. D Dimer was 1.18. EKG showed sinus tachycardia, RBBB. Patient has underlying H/O COPD, DM2, HTN. Patient is reporting that he had multiple episodes of similar events and was able to handle that at home himself. He complains of difficulty in breathing and uses albuterol inhaler multiple times in a day. Patient is not on any inhaled corticosteroid or LABA. He denies any chest pain, cough, expectoration, fever, chills. He denies any wheezing. Patient is current smoker. Patient is anxiously waiting for his wife to leave hospital [address] He is not interested in any further work-up. He reported that he had been evaluated by cardiology at [facility] years before and had heart catheterization which was normal per patient. Patient was started on heparin infusion. Cardiology was consulted and recommended further cardiac work-up. Patient remained chest pain-free in the hospital.  He reported he has dealt similar episodes at home without need for hospitalization. Patient left hospital AGAINST MEDICAL ADVICE. Patient understood all the risks of leaving hospital without further cardiac evaluation. Significant therapeutic interventions:   1. NSTEMI -patient treated with heparin infusion. Cardiology consulted. Patient was recommended further cardiac work-up however he refused and left AGAINST MEDICAL ADVICE  2. Cocaine abuse -recommend complete abstinence. Not interested.   3. Alcohol Abuse - 4. DM2 -on Metformin. Significant Diagnostic Studies:   Labs / Micro:/Radiology  Recent Labs     03/08/21  0530 03/08/21  0304 03/07/21  2304   WBC 9.2 10.1 9.2   HGB 14.9 14.5 14.9   HCT 46.7 44.6 47.4   MCV 93.0 92.9 93.5    236 185     Labs Renal Latest Ref Rng & Units 3/7/2021 8/19/2020 8/18/2020 8/15/2020 8/14/2020   BUN 8 - 23 mg/dL 12 9 11 10 12   Cr 0.70 - 1.20 mg/dL 0.92 0.75 0.80 0.93 0.89   K 3.7 - 5.3 mmol/L 4.2 3.6(L) - 4.2 3.9   Na 135 - 144 mmol/L 134(L) 139 - 137 134(L)     Lab Results   Component Value Date    ALT 35 08/15/2020    AST 38 08/15/2020    ALKPHOS 111 08/15/2020    BILITOT 0.48 08/15/2020     Lab Results   Component Value Date    TSH 0.96 08/15/2020     No results found for: HAV, HEPAIGM, HEPBIGM, HEPBCAB, HBEAG, HEPCAB  No results found for: VOL, APPEARANCE, COLORU, LABSPEC, LABPH, LEUKBLD, NITRU, GLUCOSEU, KETUA, UROBILINOGEN, KETUA, UROBILINOGEN, BILIRUBINUR, OCBU  Lab Results   Component Value Date    LABA1C 7.4 (H) 08/15/2020     Lab Results   Component Value Date     08/15/2020     Lab Results   Component Value Date    INR 1.1 03/08/2021    INR 1.1 03/08/2021    INR 1.0 08/15/2020    PROTIME 14.4 (H) 03/08/2021    PROTIME 14.3 (H) 03/08/2021    PROTIME 13.1 08/15/2020       Xr Chest Portable    Result Date: 3/7/2021  No acute intrathoracic pathology. Ct Chest Pulmonary Embolism W Contrast    Result Date: 3/8/2021  No evidence of pulmonary embolism. Small ground-glass density in the right lower lobe, which may represent acute pneumonitis; however, follow-up CT scan of the chest in 3 months may be obtained. Diffuse hepatic steatosis. RECOMMENDATIONS: CT scan of the chest without contrast in 3 months.              Consultations:    Consults:     Final Specialist Recommendations/Findings:   IP CONSULT TO CARDIOLOGY  IP CONSULT TO INTERNAL MEDICINE  IP CONSULT TO CARDIOLOGY      The patient was seen and examined on day of discharge and this discharge summary is in conjunction with any daily progress note from day of discharge. Discharge plan:     Disposition: AMA    Physician Follow Up:     No follow-up provider specified. Requiring Further Evaluation/Follow Up POST HOSPITALIZATION/Incidental Findings: return to ED if symptoms worsen     Diet:     Activity:   Instructions to Patient:     Discharge Medications:      Medication List      ASK your doctor about these medications    albuterol sulfate  (90 Base) MCG/ACT inhaler     carvedilol 6.25 MG tablet  Commonly known as: COREG     Lasix 40 MG tablet  Generic drug: furosemide     lisinopril 40 MG tablet  Commonly known as: PRINIVIL;ZESTRIL     metFORMIN 500 MG tablet  Commonly known as: GLUCOPHAGE     METHADONE HCL PO     multivitamin Tabs tablet  Take 1 tablet by mouth daily            Time Spent on discharge is  20 mins in patient examination, evaluation, counseling as well as medication reconciliation, prescriptions for required medications, discharge plan and follow up. Electronically signed by   Janet Muñiz MD  3/8/2021        Thank you Dr. Kike Chau PA-C, [place] for the opportunity to be involved in this patient's care.

## 2021-03-08 NOTE — CONSULTS
Section of Cardiology   Consult Note      Reason for Consult: Elevated cardiac enzymes  Requesting Physician: Severo Wilkinson MD    CHIEF COMPLAINT: Shortness of breath    History Obtained From:  patient, electronic medical record, medical attending    HISTORY OF PRESENT ILLNESS:      The patient is a 76 y.o. male with significant past medical history of COPD who presents with shortness of breath. Apparently was the stress at home and he took his inhalers multiple times without improvement so his wife called 911. After admitted to the hospital he improved substantially. He denied any kind of chest pain. He had no fever chills or cough. No PND orthopnea. Denies any edema or claudication. Part of his work-up cardiac enzyme was elevated and I was asked to see the patient however the patient is refusing any further work-up when he wants to go home due to personal issues. The patient is pacing around the hallway and his wife waiting for him. Apparently, 5 years ago, he had similar incident and was admitted to [facility] in 2016 and had full cardiac work-up and according to him his arteries were clean? .  I searched [facility] record and I found that he was admitted admitted in 2016 had cardiac work-up. His cardiac enzymes were elevated with troponin above 3. The patient denies smoking nicotine product and does not drink alcohol. He has no PND orthopnea. No history of edema or claudication but no recent falls. No syncope. Denies any dizziness. No weakness or numbness in any arm or leg. Previous diagnostic testing for coronary artery disease includes: cardiac catheterization, echocardiogram.   Previous history of cardiac disease includes: none. Coronary artery disease risk factors include: advanced age (older than 54 for men, 72 for women), male gender and sedentary lifestyle.     Past Medical History:    Past Medical History:   Diagnosis Date    [facility] obstructive [facility] disease) [facility]  Diabetes mellitus ([facility]     Edema     Heart attack ([facility] [facility]     History of diabetes mellitus, type II     Hypertension     Obesity      Past Surgical History:    Past Surgical History:   Procedure Laterality Date    HERNIA REPAIR      TONSILLECTOMY       Home Medications:  Prior to Admission medications    Medication Sig Start Date End Date Taking?  Authorizing Provider   metFORMIN (GLUCOPHAGE) 500 MG tablet Take 500 mg by mouth 2 times daily (with meals)   Yes Historical Provider, MD   Multiple Vitamin (MULTIVITAMIN) TABS tablet Take 1 tablet by mouth daily 8/20/20   Kenneth Santos DO   METHADONE HCL PO Take 70 mg by mouth daily     Historical Provider, MD   lisinopril (PRINIVIL;ZESTRIL) 40 MG tablet Take 1 tablet by mouth 11/23/19   Historical Provider, MD   albuterol sulfate  (90 Base) MCG/ACT inhaler Inhale into the lungs    Historical Provider, MD   carvedilol (COREG) 6.25 MG tablet Take 6.25 tablets by mouth 2 times daily  11/25/19   Historical Provider, MD   furosemide (LASIX) 40 MG tablet Take 40 mg by mouth daily     Historical Provider, MD     Current Medications:    Current Facility-Administered Medications   Medication Dose Route Frequency Provider Last Rate Last Admin    furosemide (LASIX) tablet 40 mg  40 mg Oral Daily KATELYN Purcell CNP        lisinopril (PRINIVIL;ZESTRIL) tablet 40 mg  40 mg Oral Daily KATELYN Purcell CNP   40 mg at 03/08/21 0921    carvedilol (COREG) tablet 6.25 mg  6.25 mg Oral BID KATELYN Purcell CNP        multivitamin 1 tablet  1 tablet Oral Daily KATELYN Purcell CNP        sodium chloride flush 0.9 % injection 10 mL  10 mL Intravenous 2 times per day KATELYN Purcell CNP        sodium chloride flush 0.9 % injection 10 mL  10 mL Intravenous PRN KATELYN Purcell CNP        potassium chloride (KLOR-CON M) extended release tablet 40 mEq  40 mEq Oral PRN KATELYN Purcell CNP Or    potassium bicarb-citric acid (EFFER-K) effervescent tablet 40 mEq  40 mEq Oral PRN KATELYN Purcell - CNP        Or    potassium chloride 10 mEq/100 mL IVPB (Peripheral Line)  10 mEq Intravenous PRN KATELYN Purcell - CNP        magnesium sulfate 1000 mg in dextrose 5% 100 mL IVPB  1,000 mg Intravenous PRN Jaylyn Cervantes APRN - CNP        famotidine (PEPCID) tablet 20 mg  20 mg Oral BID KATELYN Purcell - CNP        promethazine (PHENERGAN) tablet 12.5 mg  12.5 mg Oral Q6H PRN KATELYN Purcell - CNP        Or    ondansetron (ZOFRAN) injection 4 mg  4 mg Intravenous Q6H PRN KATELYN Purcell - CNP        acetaminophen (TYLENOL) tablet 650 mg  650 mg Oral Q6H PRN KATELYN Purcell - CNP        Or    acetaminophen (TYLENOL) suppository 650 mg  650 mg Rectal Q6H PRN KATELYN Purcell - CNP        magnesium hydroxide (MILK OF MAGNESIA) 400 MG/5ML suspension 30 mL  30 mL Oral Daily PRN KATELYN Purcell - CNP        atorvastatin (LIPITOR) tablet 80 mg  80 mg Oral Nightly KATELYN Purcell CNP        nitroGLYCERIN (NITROSTAT) SL tablet 0.4 mg  0.4 mg Sublingual Q5 Min PRN KATELYN Purcell - CNP        [START ON 3/9/2021] aspirin chewable tablet 81 mg  81 mg Oral Daily KATELYN Purcell - CNP        heparin (porcine) injection 4,000 Units  4,000 Units Intravenous PRN KATELYN Purcell - CNP        heparin (porcine) injection 2,000 Units  2,000 Units Intravenous PRN KATELYN Purcell - CNP        heparin 25,000 units in dextrose 5% 250 mL (premix) infusion  454-1,000 Units/hr Intravenous Continuous KATELYN Purcell CNP 7.3 mL/hr at 03/08/21 0835 8.03 Units/kg/hr at 03/08/21 0210     Allergies:  Patient has no known allergies.     Social History:    Social History     Socioeconomic History    Marital status:      Spouse name: Not on file    Number of children: Not on apparent distress, and appears stated age  EYES: Pupils equal, round and reactive to light, extra ocular muscles intact, sclera clear, conjunctiva normal  ENT:  normocepalic, without obvious abnormality  NECK:  supple, symmetrical, trachea midline, no carotid bruit ,   No  JVD  BACK:  symmetric  LUNGS: Non-labored, good air exchange, clear to auscultation bilaterally, no crackles or wheezing  CARDIOVASCULAR:  Normal apical impulse, regular rate and rhythm, normal S1 and S2, no S3 or S4, and no murmur noted, no rub.  dorsalis pedis and bilateralpresent 1+  ABDOMEN:  No scars, normal bowel sounds, soft, non-distended, non-tender, no masses palpated, no hepatosplenomegally, no bruit. MUSCULOSKELETAL:  there is no redness, warmth, or swelling of the joints   No leg edema. NEUROLOGIC:  Awake, alert, oriented to name, place and time. SKIN:  no bruising or bleeding, normal skin color, texture, turgor and no jaundice    DATA:   ECG: Normal sinus rhythm with chronic right bundle branch block. No new changes.   ECHO: Date: Patient refused     Stress Test:  Not performed to date  Angiography:  Not performed to date    Cardiology Labs:  Recent Labs     03/08/21  0412 03/08/21  0530 03/08/21  0909   TROPONINT NOT REPORTED NOT REPORTED NOT REPORTED     Warfarin PT/INR:  Lab Results   Component Value Date    PROTIME 14.4 03/08/2021    INR 1.1 03/08/2021     CBC:  Lab Results   Component Value Date    WBC 9.2 03/08/2021    RBC 5.02 03/08/2021    HGB 14.9 03/08/2021    HCT 46.7 03/08/2021    MCV 93.0 03/08/2021    MCH 29.7 03/08/2021    MCHC 31.9 03/08/2021    RDW 13.2 03/08/2021     03/08/2021    MPV 11.3 03/08/2021     CMP:  Lab Results   Component Value Date     03/07/2021    K 4.2 03/07/2021    CL 97 03/07/2021    CO2 25 03/07/2021    BUN 12 03/07/2021    CREATININE 0.92 03/07/2021    GFRAA >60 03/07/2021    LABGLOM >60 03/07/2021    GLUCOSE 439 03/07/2021    CALCIUM 9.1 03/07/2021     Magnesium:    Lab Results Component Value Date    MG 2.3 08/15/2020     PTT:    Lab Results   Component Value Date    APTT 112.7 03/08/2021     TSH:    Lab Results   Component Value Date    TSH 0.96 08/15/2020     BMP:  Lab Results   Component Value Date     03/07/2021    K 4.2 03/07/2021    CL 97 03/07/2021    CO2 25 03/07/2021    BUN 12 03/07/2021    LABALBU 3.4 08/15/2020    CREATININE 0.92 03/07/2021    CALCIUM 9.1 03/07/2021    GFRAA >60 03/07/2021    LABGLOM >60 03/07/2021    GLUCOSE 439 03/07/2021     LIVER PROFILE:No results for input(s): AST, ALT, LABALBU, ALKPHOS, BILITOT, BILIDIR, IBILI, PROT, GLOB, ALBUMIN in the last 72 hours. FLP:    Lab Results   Component Value Date    TRIG 225 08/15/2020             IMPRESSION  1. Elevated cardiac enzymes suggestive but not definitive for non-ST elevation MI, acute  2. Cocaine and heroin use  3. COPD  4. Diabetes mellitus    Patient Active Problem List   Diagnosis    COPD [facility] exacerbation [facility] [address]    Coronary artery disease involving native coronary artery of native heart without angina pectoris    Essential hypertension    Type 2 diabetes mellitus, without long-term current use of insulin (HCC)    Foot ulcer, left (HCC)    Chronic heart failure (HCC)    MRSA infection    Alcohol abuse    Abdominal pain, epigastric    Morbid obesity ([address]    Anxiety    Nausea    Ileus ([address]    Adynamic ileus ([address]    Pancreatic pseudocyst    NSTEMI (non-ST elevated myocardial infarction) ([address]    Multiple substance abuse ([address]           RECOMMENDATIONS:     Continue current medications  Management plan was discussed with patient     I explained to the patient that at his age possibility of underlying coronary disease is much higher than when he was younger. And the fact that he had elevated cardiac enzymes and negative stress cardiac catheterization 5 years ago does not mean it is still the case.   Definitely using cocaine product can increase the risk of vasospasm in his coronary arteries and induce myocardial infarction. I recommended further cardiac work-up however the patient absolutely refused and he wants to go home. I discussed the case with the medical attending, Dr. Leilani Cross, patient will be discharged on aspirin and statin. I given my business card and asked him to call me to follow-up in the office. Prognosis is guarded  Thank you for consultation.       Electronically signed by Brendan Garibay MD on 3/8/2021 at 10:08 AM     CC: Massimo iLn PA-C

## 2021-03-08 NOTE — ED NOTES
Bed: 20  Expected date:   Expected time:   Means of arrival:   Comments:  [facility] Candy  03/07/21 [id number]

## 2021-03-08 NOTE — ED NOTES
Pt brought to ED via EMS. Pt was found diaphoretic and states he felt SOB after using heroin and cocaine. Pt has PMH of drug use. EMS stated that pt had issues breathing in non-rebreather mask, pt is 96% on RA in this facility and breathing non-labored. He is A&O and anxious to go home.       Erinn Snow RN  03/07/21 2064

## 2021-03-08 NOTE — PROGRESS NOTES
Patient admitted from ED to Progressive Care Unit, room 1007 via stretcher. Heparin gtt started in ED. Placed on heart monitor. Vital signs taken. Attempted to complete admission database. Patient is oriented to self & place. Patient admits to cocaine & heparin abuse prior to admission, but denies it was a suicide attempt. Patient is very lethargic upon admission. Bed alarm activated & side rails raised for patient safety.

## 2021-03-08 NOTE — ED NOTES
Wife is requesting that any phone calls from family be declined. She is aware that family knows he is here, but they do not want him to have further communication.      Ajith Corcoran  03/08/21 3237

## 2021-03-08 NOTE — ED PROVIDER NOTES
EMERGENCY DEPARTMENT ENCOUNTER    Pt Name: Nany Funes  MRN: [id number]  [place] 1955  Date of evaluation: 3/8/21  CHIEF COMPLAINT       Chief Complaint   Patient presents with    Drug Overdose    Shortness of Breath     HISTORY OF PRESENT ILLNESS   50-year-old male presenting to the emergency room with an episode of shortness of breath/difficulty breathing. Patient had called the squad when this occurred. Patient does have history of drug abuse. He had used heroin in combination with cocaine earlier tonight. The difficulty breathing happened after he used. When squad arrived patient was diaphoretic and in distress. They had placed him on a nonrebreather temporarily however symptoms seem to improve rapidly without any other intervention. Here in the ED patient has no complaints. He is denying any chest pain or difficulty breathing at this time. He states that he \"feels fine. \"          REVIEW OF SYSTEMS     Review of Systems   Constitutional: Negative for activity change, chills and diaphoresis. HENT: Negative for congestion, sinus pain and tinnitus. Eyes: Negative. Respiratory: Positive for shortness of breath. Negative for apnea and chest tightness. Gastrointestinal: Negative for abdominal distention, constipation, diarrhea and vomiting. Genitourinary: Negative for difficulty urinating and frequency. Musculoskeletal: Negative for arthralgias and myalgias. Skin: Negative for color change and rash. Neurological: Negative for dizziness. Hematological: Negative. Psychiatric/Behavioral: Negative. PASTMEDICAL HISTORY     Past Medical History:   Diagnosis Date    COPD (chronic obstructive pulmonary disease) ([address]     Diabetes mellitus ([address]     Edema     Heart attack [facility] [facility]     History of diabetes mellitus, type II     Hypertension     Obesity      Past Problem List  Patient Active Problem List   Diagnosis Code    Cellulitis of left lower extremity L03. 116    COPD without exacerbation ([facility] J44.9    Coronary artery disease involving native coronary artery of native heart without angina pectoris I25.10    Essential hypertension I10    Type 2 diabetes mellitus, without long-term current use of insulin ([facility]) E11.9    Foot ulcer, left ([facility] L97.529    Chronic heart failure ([facility]) I50.9    MRSA infection A49.02    Acute pancreatitis K85.90    Alcohol abuse F10.10    Abdominal pain, epigastric R10.13    Morbid obesity ([facility]) E66.01    Anxiety F41.9    Nausea R11.0    Ileus ([facility]) K56.7    Adynamic ileus ([facility]) K56.0    Pancreatic pseudocyst K86.3    NSTEMI (non-ST elevated myocardial infarction) ([facility] I21.4     SURGICAL HISTORY       Past Surgical History:   Procedure Laterality Date    HERNIA REPAIR      TONSILLECTOMY       CURRENT MEDICATIONS       Current Discharge Medication List      CONTINUE these medications which have NOT CHANGED    Details   albuterol sulfate  (90 Base) MCG/ACT inhaler Inhale into the lungs      metFORMIN (GLUCOPHAGE) 500 MG tablet Take 500 mg by mouth 2 times daily (with meals)      Multiple Vitamin (MULTIVITAMIN) TABS tablet Take 1 tablet by mouth daily  Qty:  , Refills: 0      METHADONE HCL PO Take 70 mg by mouth daily       lisinopril (PRINIVIL;ZESTRIL) 40 MG tablet Take 1 tablet by mouth      carvedilol (COREG) 6.25 MG tablet Take 6.25 tablets by mouth 2 times daily       furosemide (LASIX) 40 MG tablet Take 40 mg by mouth daily            ALLERGIES     has No Known Allergies. FAMILY HISTORY     He indicated that his mother is alive. He indicated that his father is . He indicated that his paternal grandfather is .      SOCIAL HISTORY       Social History     Tobacco Use    Smoking status: Former Smoker    Smokeless tobacco: Never Used    Tobacco comment: quit \"a couple years ago\"   Substance Use Topics    Alcohol use: Not Currently    Drug use: Yes     Types: Cocaine, Other-see comments     Comment: Pt states using heroin, denies taking methadone currently     PHYSICAL EXAM     INITIAL VITALS: BP (!) 100/50   Pulse 77   Temp 98.2 °F (36.8 °C) (Oral)   Resp 16   Ht 5' 11\" (1.803 m)   Wt 201 lb 11.2 oz (91.5 kg)   SpO2 95%   BMI 28.13 kg/m²    Physical Exam  Constitutional:       General: He is not in acute distress. Appearance: He is well-developed. HENT:      Head: Normocephalic. Eyes:      Pupils: Pupils are equal, round, and reactive to light. Cardiovascular:      Rate and Rhythm: Normal rate and regular rhythm. Heart sounds: Normal heart sounds. Pulmonary:      Effort: Pulmonary effort is normal. No respiratory distress. Breath sounds: Normal breath sounds. Abdominal:      General: Bowel sounds are normal.      Palpations: Abdomen is soft. Tenderness: There is no abdominal tenderness. Musculoskeletal: Normal range of motion. Skin:     General: Skin is warm and dry. Neurological:      Mental Status: He is alert and oriented to person, place, and time. MEDICAL DECISION MAKIN-year-old male presenting to the emergency room with episode of dyspnea after drug ingestion. Patient is alert oriented and in no distress upon initial evaluation. He had no complaints upon initial examination. Patient troponin trended from 58 to 208 at the 2-hour bharti. Patient not having any chest pain. He does not have any significant EKG changes. Patient will be treated as NSTEMI at this time. Case was discussed with cardiology DOMINIQUE Casas who agrees to starting aspirin and heparin. Patient will be admitted to [facility]. Patient has history of opioid misuse; he has high concern for withdrawals and would like to be treated to prevent withdrawals during his hospitalization. Patient has been otherwise hemodynamically stable and is in no distress here in the ED. CT imaging of the chest is negative for pulmonary embolism.       ED Course as of Mar 08 0503   Mon Mar 08, 2021 0207 Patient updated on lab results and NSTEMI diagnosis. [EG]   0207 Patient reports that due to his drug misuse he is going to require some type of therapy to prevent withdrawals. He is requesting methadone.    [EG]      ED Course User Index  [EG] Marco Antonio Fishman MD       CRITICAL CARE:       PROCEDURES:    Procedures    DIAGNOSTIC RESULTS   EKG:All EKG's are interpreted by the Emergency Department Physician who either signs or Co-signs this chart in the absence of a cardiologist.    EKG 1-sinus rhythm with rate 100   right bundle branch block-seen on previous EKG    QTc 472  Nonspecific EKG    EKG 2 sinus rhythm with rate 92   right bundle branch block    no change from EKG 1    RADIOLOGY:All plain film, CT, MRI, and formal ultrasound images (except ED bedside ultrasound) are read by the radiologist, see reports below, unless otherwisenoted in MDM or here. CT CHEST PULMONARY EMBOLISM W CONTRAST   Final Result   No evidence of pulmonary embolism. Small ground-glass density in the right lower lobe, which may represent acute   pneumonitis; however, follow-up CT scan of the chest in 3 months may be   obtained. Diffuse hepatic steatosis. RECOMMENDATIONS:   CT scan of the chest without contrast in 3 months. XR CHEST PORTABLE   Final Result      No acute intrathoracic pathology. LABS: All lab results were reviewed by myself, and all abnormals are listed below.   Labs Reviewed   CBC WITH AUTO DIFFERENTIAL - Abnormal; Notable for the following components:       Result Value    Seg Neutrophils 66 (*)     Lymphocytes 20 (*)     Eosinophils % 7 (*)     Absolute Eos # 0.64 (*)     All other components within normal limits   BASIC METABOLIC PANEL W/ REFLEX TO MG FOR LOW K - Abnormal; Notable for the following components:    Glucose 439 (*)     Sodium 134 (*)     Chloride 97 (*)     All other components within normal limits   D-DIMER, QUANTITATIVE - Abnormal; Notable methadone 10 MG/5ML solution 18 mg    heparin (porcine) injection 4,000 Units    DISCONTD: heparin (porcine) injection 4,000 Units    DISCONTD: heparin (porcine) injection 2,000 Units    DISCONTD: heparin 25,000 units in dextrose 5% 250 mL (premix) infusion    furosemide (LASIX) tablet 40 mg    lisinopril (PRINIVIL;ZESTRIL) tablet 40 mg    carvedilol (COREG) tablet 39.0625 mg    multivitamin 1 tablet    sodium chloride flush 0.9 % injection 10 mL    sodium chloride flush 0.9 % injection 10 mL    OR Linked Order Group     potassium chloride (KLOR-CON M) extended release tablet 40 mEq     potassium bicarb-citric acid (EFFER-K) effervescent tablet 40 mEq     potassium chloride 10 mEq/100 mL IVPB (Peripheral Line)    magnesium sulfate 1000 mg in dextrose 5% 100 mL IVPB    famotidine (PEPCID) tablet 20 mg     May cancel order if already on H2 blocker/PPI alternative as a home medication to avoid duplication of therapy.  OR Linked Order Group     promethazine (PHENERGAN) tablet 12.5 mg     ondansetron (ZOFRAN) injection 4 mg    OR Linked Order Group     acetaminophen (TYLENOL) tablet 650 mg     acetaminophen (TYLENOL) suppository 650 mg    magnesium hydroxide (MILK OF MAGNESIA) 400 MG/5ML suspension 30 mL    metoprolol tartrate (LOPRESSOR) tablet 25 mg    atorvastatin (LIPITOR) tablet 80 mg    nitroGLYCERIN (NITROSTAT) SL tablet 0.4 mg    aspirin chewable tablet 81 mg    DISCONTD: heparin (porcine) injection 5,440 Units    heparin (porcine) injection 4,000 Units    heparin (porcine) injection 2,000 Units    heparin 25,000 units in dextrose 5% 250 mL (premix) infusion     CONSULTS:  IP CONSULT TO CARDIOLOGY  IP CONSULT TO INTERNAL MEDICINE  IP CONSULT TO CARDIOLOGY    FINAL IMPRESSION    No diagnosis found. DISPOSITION/PLAN   DISPOSITION Admitted 03/08/2021 03:27:31 AM      PATIENT REFERRED TO:  No follow-up provider specified.   DISCHARGE MEDICATIONS:  Current Discharge Medication List Jyothi Lozada MD  Attending Emergency Physician                 Wyatt Snyder MD  03/08/21 3235

## 2021-03-08 NOTE — H&P
[facility]  Office: [address], DO, Bob Migue, DO, Cameron Paintero, DO, Tena Jessica Blood, DO, Levy Douglas MD, Karen Garcia MD, Julio Holder MD, Kasi Luciano MD, Zulema Fraser MD, Papito Oliva MD, Candie Casas MD, Anum Jensen MD, Aiden Pizano MD, Stephen Lezama, DO, Marlo Zambrano MD, Patricio March DO, Tracey Ko MD,  Mohan Astudillo, DO, Jane Coleman MD, Machelle Garcia MD, Stoney Hackett, [facility], [facility] Perkins, CNP, Wero Julien, CNP, Vida Mcclain, CNS, Nasir Arredondo, [facility], Raynell Councilman, CNP, Thanh Jose, CNP, Raheem Pardo, CNP, Grecia La, CNP, Maria Antonia Rasmussen PA-C, Coreen Torres, [facility], Erika Hurley, CNP, Daylin Brown, CNP, Aneesh Mckinley, CNP, Brendan Leal, CNP, Deonte Major, CNP, [facility], [address] [place] [address]      HISTORY AND PHYSICAL EXAMINATION            Date:   3/8/2021  Patient name:  Maria Guadalupe Akhtar  Date of admission:  3/7/2021 10:54 PM  MRN:   [id number]  Account:  [de-identified]  YOB: 1955  PCP:    Marita Green PA-C  Room:   [address] [place] [address]  Code Status:    Full Code    Chief Complaint:     Chief Complaint   Patient presents with    Drug Overdose    Shortness of Breath       History Obtained From:     patient, electronic medical record    History of Present Illness: The patient is a 76 y.o. Non-/non  male who presents with Drug Overdose and Shortness of Breath   and he is admitted to the hospital for the management of  NSTEMI (non-ST elevated myocardial infarction) ([facility] [address] Patient was brought to ED after his wife called EMS for patient having breathing difficulty. Patient had used  cocaine and methadone. On arrival of EMS at home , patient was diaphoretic and was in acute distress. Patient was placed on Non rebreather. Patient was found to have blood pressure 132/82, temperature 97.5, heart rate 100 BPM.  Lab evaluation showed troponin 228, Na 134. D Dimer was 1.18.  EKG showed sinus tachycardia, RBBB. Patient has underlying H/O COPD, DM2, HTN. Patient is reporting that he had multiple episodes of similar events and was able to handle that at home himself. He denies any chest pain, cough, expectoration, fever, chills. He denies any wheezing. Patient is current smoker. Patient is anxiously waiting for his wife to leave hospital [address] He is not interested in any further work-up. He reported that he had been evaluated by cardiology at [facility] 5 years before and had heart catheterization which was normal per patient. Past Medical History:     Past Medical History:   Diagnosis Date    COPD [facility]chronic obstructive pulmonary disease) ([facility] [address]     Diabetes mellitus ([facility] [address]     Edema     Heart attack ([facility] [address]     History of diabetes mellitus, type II     Hypertension     Obesity         Past Surgical History:     Past Surgical History:   Procedure Laterality Date    HERNIA REPAIR      TONSILLECTOMY          Medications Prior to Admission:     Prior to Admission medications    Medication Sig Start Date End Date Taking? Authorizing Provider   albuterol sulfate  (90 Base) MCG/ACT inhaler Inhale into the lungs   Yes Historical Provider, MD   metFORMIN (GLUCOPHAGE) 500 MG tablet Take 500 mg by mouth 2 times daily (with meals)   Yes Historical Provider, MD   Multiple Vitamin (MULTIVITAMIN) TABS tablet Take 1 tablet by mouth daily 8/20/20   Betty Santos DO   METHADONE HCL PO Take 70 mg by mouth daily     Historical Provider, MD   lisinopril (PRINIVIL;ZESTRIL) 40 MG tablet Take 1 tablet by mouth 11/23/19   Historical Provider, MD   carvedilol (COREG) 6.25 MG tablet Take 6.25 tablets by mouth 2 times daily  11/25/19   Historical Provider, MD   furosemide (LASIX) 40 MG tablet Take 40 mg by mouth daily     Historical Provider, MD        Allergies:     Patient has no known allergies. Social History:     Tobacco:    reports that he has quit smoking.  He has never used smokeless tobacco.  Alcohol:      reports previous alcohol use. Drug Use:  reports current drug use. Drugs: Cocaine and Other-see comments. Family History:     Family History   Problem Relation Age of Onset    No Known Problems Mother     Diabetes Father     Heart Disease Father     Heart Disease Paternal Grandfather        Review of Systems:     Positive and Negative as described in HPI. Review of Systems   Constitutional: Negative for activity change, appetite change, fatigue, fever and unexpected weight change. HENT: Negative for congestion, nosebleeds, rhinorrhea, sinus pressure, sneezing and voice change. Respiratory: Negative for cough, choking, chest tightness, shortness of breath and wheezing. Cardiovascular: Negative for chest pain, palpitations and leg swelling. Gastrointestinal: Negative for abdominal pain, constipation, diarrhea, nausea and vomiting. Genitourinary: Negative for difficulty urinating, discharge, dysuria, frequency and testicular pain. Musculoskeletal: Negative for back pain. Skin: Negative for rash. Neurological: Negative for dizziness, weakness, light-headedness and headaches. Hematological: Does not bruise/bleed easily. Psychiatric/Behavioral: Negative for agitation, behavioral problems, confusion, self-injury, sleep disturbance and suicidal ideas. Physical Exam:   BP (!) 100/50   Pulse 77   Temp 98.2 °F (36.8 °C) (Oral)   Resp 16   Ht 5' 11\" (1.803 m)   Wt 201 lb 11.2 oz (91.5 kg)   SpO2 95%   BMI 28.13 kg/m²   Temp (24hrs), Av.9 °F (36.6 °C), Min:97.5 °F (36.4 °C), Max:98.2 °F (36.8 °C)    No results for input(s): POCGLU in the last 72 hours. No intake or output data in the 24 hours ending 21 0708  Physical Exam  Vitals signs and nursing note reviewed. Constitutional:       General: He is not in acute distress. Appearance: He is not diaphoretic. HENT:      Head: Normocephalic and atraumatic.       Nose:      Right Sinus: No maxillary sinus tenderness or frontal sinus tenderness. Left Sinus: No maxillary sinus tenderness or frontal sinus tenderness. Mouth/Throat:      Pharynx: No oropharyngeal exudate. Eyes:      General: No scleral icterus. Conjunctiva/sclera: Conjunctivae normal.      Pupils: Pupils are equal, round, and reactive to light. Neck:      Musculoskeletal: Full passive range of motion without pain and neck supple. Thyroid: No thyromegaly. Vascular: No JVD. Cardiovascular:      Rate and Rhythm: Normal rate and regular rhythm. Pulses:           Dorsalis pedis pulses are 2+ on the right side and 2+ on the left side. Heart sounds: Normal heart sounds. No murmur. Pulmonary:      Effort: Pulmonary effort is normal.      Breath sounds: Normal breath sounds. No wheezing or rales. Abdominal:      Palpations: Abdomen is soft. There is no mass. Tenderness: There is no abdominal tenderness. Lymphadenopathy:      Head:      Right side of head: No submandibular adenopathy. Left side of head: No submandibular adenopathy. Cervical: No cervical adenopathy. Skin:     General: Skin is warm. Neurological:      Mental Status: He is alert and oriented to person, place, and time. Motor: No tremor. Psychiatric:         Behavior: Behavior is cooperative.          Investigations:      Laboratory Testing:  Recent Results (from the past 24 hour(s))   EKG 12 Lead    Collection Time: 03/07/21 10:56 PM   Result Value Ref Range    Ventricular Rate 100 BPM    Atrial Rate 100 BPM    P-R Interval 204 ms    QRS Duration 132 ms    Q-T Interval 366 ms    QTc Calculation (Bazett) 472 ms    P Axis 67 degrees    R Axis 49 degrees    T Axis 81 degrees   CBC Auto Differential    Collection Time: 03/07/21 11:04 PM   Result Value Ref Range    WBC 9.2 3.5 - 11.3 k/uL    RBC 5.07 4.21 - 5.77 m/uL    Hemoglobin 14.9 13.0 - 17.0 g/dL    Hematocrit 47.4 40.7 - 50.3 %    MCV 93.5 82.6 - 102.9 fL    MCH 29.4 25.2 - 33.5 pg    MCHC 31.4 28.4 - 34.8 g/dL    RDW 13.1 11.8 - 14.4 %    Platelets 079 428 - 556 k/uL    MPV 11.3 8.1 - 13.5 fL    NRBC Automated 0.0 0.0 per 100 WBC    Differential Type NOT REPORTED     Seg Neutrophils 66 (H) 36 - 65 %    Lymphocytes 20 (L) 24 - 43 %    Monocytes 7 3 - 12 %    Eosinophils % 7 (H) 1 - 4 %    Basophils 0 0 - 2 %    Immature Granulocytes 0 0 %    Segs Absolute 6.01 1.50 - 8.10 k/uL    Absolute Lymph # 1.86 1.10 - 3.70 k/uL    Absolute Mono # 0.63 0.10 - 1.20 k/uL    Absolute Eos # 0.64 (H) 0.00 - 0.44 k/uL    Basophils Absolute 0.03 0.00 - 0.20 k/uL    Absolute Immature Granulocyte 0.03 0.00 - 0.30 k/uL    WBC Morphology NOT REPORTED     RBC Morphology NOT REPORTED     Platelet Estimate NOT REPORTED    Basic Metabolic Panel w/ Reflex to MG    Collection Time: 03/07/21 11:04 PM   Result Value Ref Range    Glucose 439 (HH) 70 - 99 mg/dL    BUN 12 8 - 23 mg/dL    CREATININE 0.92 0.70 - 1.20 mg/dL    Bun/Cre Ratio 13 9 - 20    Calcium 9.1 8.6 - 10.4 mg/dL    Sodium 134 (L) 135 - 144 mmol/L    Potassium 4.2 3.7 - 5.3 mmol/L    Chloride 97 (L) 98 - 107 mmol/L    CO2 25 20 - 31 mmol/L    Anion Gap 12 9 - 17 mmol/L    GFR Non-African American >60 >60 mL/min    GFR African American >60 >60 mL/min    GFR Comment          GFR Staging NOT REPORTED    D-Dimer, Quantitative    Collection Time: 03/07/21 11:04 PM   Result Value Ref Range    D-Dimer, Quant 1.18 (H) 0.00 - 0.59 mg/L FEU   Troponin    Collection Time: 03/07/21 11:04 PM   Result Value Ref Range    Troponin, High Sensitivity 58 (HH) 0 - 22 ng/L    Troponin T NOT REPORTED <0.03 ng/mL    Troponin Interp NOT REPORTED    Lactic Acid    Collection Time: 03/07/21 11:04 PM   Result Value Ref Range    Lactic Acid 2.8 (H) 0.5 - 2.2 mmol/L   Troponin    Collection Time: 03/08/21 12:56 AM   Result Value Ref Range    Troponin, High Sensitivity 208 (HH) 0 - 22 ng/L    Troponin T NOT REPORTED <0.03 ng/mL    Troponin Interp NOT REPORTED Troponin    Collection Time: 03/08/21  1:52 AM   Result Value Ref Range    Troponin, High Sensitivity 236 (HH) 0 - 22 ng/L    Troponin T NOT REPORTED <0.03 ng/mL    Troponin Interp NOT REPORTED    EKG 12 Lead    Collection Time: 03/08/21  1:53 AM   Result Value Ref Range    Ventricular Rate 92 BPM    Atrial Rate 92 BPM    P-R Interval 206 ms    QRS Duration 128 ms    Q-T Interval 380 ms    QTc Calculation (Bazett) 469 ms    P Axis 65 degrees    R Axis 53 degrees    T Axis 73 degrees   COVID-19, Rapid    Collection Time: 03/08/21  3:00 AM    Specimen: Nasopharyngeal Swab   Result Value Ref Range    Specimen Description . NASOPHARYNGEAL SWAB     SARS-CoV-2, Rapid Not Detected Not Detected   CBC    Collection Time: 03/08/21  3:04 AM   Result Value Ref Range    WBC 10.1 3.5 - 11.3 k/uL    RBC 4.80 4.21 - 5.77 m/uL    Hemoglobin 14.5 13.0 - 17.0 g/dL    Hematocrit 44.6 40.7 - 50.3 %    MCV 92.9 82.6 - 102.9 fL    MCH 30.2 25.2 - 33.5 pg    MCHC 32.5 28.4 - 34.8 g/dL    RDW 13.1 11.8 - 14.4 %    Platelets 158 653 - 994 k/uL    MPV 11.1 8.1 - 13.5 fL    NRBC Automated 0.0 0.0 per 100 WBC   APTT    Collection Time: 03/08/21  3:04 AM   Result Value Ref Range    PTT 36.0 (H) 23.9 - 33.8 sec   Protime-INR    Collection Time: 03/08/21  3:04 AM   Result Value Ref Range    Protime 14.3 (H) 11.5 - 14.2 sec    INR 1.1    Troponin    Collection Time: 03/08/21  4:12 AM   Result Value Ref Range    Troponin, High Sensitivity 242 (HH) 0 - 22 ng/L    Troponin T NOT REPORTED <0.03 ng/mL    Troponin Interp NOT REPORTED    Troponin    Collection Time: 03/08/21  5:30 AM   Result Value Ref Range    Troponin, High Sensitivity 228 (HH) 0 - 22 ng/L    Troponin T NOT REPORTED <0.03 ng/mL    Troponin Interp NOT REPORTED    CBC    Collection Time: 03/08/21  5:30 AM   Result Value Ref Range    WBC 9.2 3.5 - 11.3 k/uL    RBC 5.02 4.21 - 5.77 m/uL    Hemoglobin 14.9 13.0 - 17.0 g/dL    Hematocrit 46.7 40.7 - 50.3 %    MCV 93.0 82.6 - 102.9 fL    MCH Geni Ayala     Consultations:   IP CONSULT TO CARDIOLOGY  IP CONSULT TO INTERNAL MEDICINE  IP CONSULT TO CARDIOLOGY    Patient is admitted as inpatient status because of co-morbidities listed above, severity of signs and symptoms as outlined, requirement for current medical therapies and most importantly because of direct risk to patient if care not provided in a hospital setting.     Linsey Gaston MD  3/8/2021    Copy sent to Dr. Maxine Cassidy PA-C, KASSIE    (Please note that portions of this note were completed with a voice recognition program. Efforts were made to edit the dictations but occasionally words are mis-transcribed.)

## 2021-03-08 NOTE — CARE COORDINATION
Case Management Initial Discharge Plan  Ada Agent,         Readmission Risk              Risk of Unplanned Readmission:        16             Met with:patient to discuss discharge plans. Information verified: address, contacts, phone number, , insurance Yes  PCP: Farrah Gandara PA-C  Date of last visit: it has been years     Insurance Provider: Asif     Discharge Planning  Current Residence:  Private home   Living Arrangements:  Spouse/Significant Other     1   Home has 1 stories/0 stairs to climb  Support Systems:  Spouse/Significant Other       Current Services PTA:  None  Agency: none      Patient able to perform ADL's:Independent  DME in home:  None   DME used to aid ambulation prior to admission:   None   DME used during admission:  None     Potential Assistance Needed:  N/A    Pharmacy: [facility] [address]    Potential Assistance Purchasing Medications:  No  Does patient want to participate in local refill/ meds to beds program?  No    Patient agreeable to home care: No  [facility] of choice provided:  n/a      Type of Home Care Services:  None  Patient expects to be discharged to:  home    Prior SNF/Rehab Placement and Facility: none  Agreeable to SNF/Rehab: No  [facility] of choice provided: no   Evaluation: n/a    Expected Discharge date:  21  Follow Up Appointment: Best Day/ Time: Monday AM    Transportation provider: per family  Transportation arrangements needed for discharge: No    Discharge Plan:   Met with patient to discuss a plan of care. Patient lives with wife and very independent. . he is most concerned about his methadone. He stated that he takes 70 mg daily. When inquired about which clinic he sees he stated he doesn't. He gets his methadone on the streets. Offered to give clinic list and substance abuse materials and he refused. Discussed going AMA. Will continue to follow.      Electronically signed by Femi Herring RN on 3/8/21 at 12:02 PM EST

## 2022-02-20 ENCOUNTER — APPOINTMENT (OUTPATIENT)
Dept: GENERAL RADIOLOGY | Age: 67
End: 2022-02-20
Payer: MEDICARE

## 2022-02-20 ENCOUNTER — APPOINTMENT (OUTPATIENT)
Dept: CT IMAGING | Age: 67
End: 2022-02-20
Payer: MEDICARE

## 2022-02-20 ENCOUNTER — HOSPITAL ENCOUNTER (EMERGENCY)
Age: 67
Discharge: HOME | End: 2022-02-20
Attending: EMERGENCY MEDICINE
Payer: MEDICARE

## 2022-02-20 VITALS
RESPIRATION RATE: 14 BRPM | WEIGHT: 230 LBS | DIASTOLIC BLOOD PRESSURE: 85 MMHG | TEMPERATURE: 98.4 F | OXYGEN SATURATION: 93 % | BODY MASS INDEX: 32.08 KG/M2 | SYSTOLIC BLOOD PRESSURE: 157 MMHG | HEART RATE: 94 BPM

## 2022-02-20 DIAGNOSIS — J44.1 COPD EXACERBATION (HCC): Primary | ICD-10-CM

## 2022-02-20 LAB
ABSOLUTE EOS #: 0.89 K/UL (ref 0–0.44)
ABSOLUTE IMMATURE GRANULOCYTE: 0.04 K/UL (ref 0–0.3)
ABSOLUTE LYMPH #: 2.71 K/UL (ref 1.1–3.7)
ABSOLUTE MONO #: 0.66 K/UL (ref 0.1–1.2)
ANION GAP SERPL CALCULATED.3IONS-SCNC: 14 MMOL/L (ref 9–17)
BASOPHILS # BLD: 1 % (ref 0–2)
BASOPHILS ABSOLUTE: 0.06 K/UL (ref 0–0.2)
BNP INTERPRETATION: ABNORMAL
BUN BLDV-MCNC: 10 MG/DL (ref 8–23)
BUN/CREAT BLD: 11 (ref 9–20)
CALCIUM SERPL-MCNC: 9.3 MG/DL (ref 8.6–10.4)
CHLORIDE BLD-SCNC: 96 MMOL/L (ref 98–107)
CO2: 25 MMOL/L (ref 20–31)
CREAT SERPL-MCNC: 0.9 MG/DL (ref 0.7–1.2)
D-DIMER QUANTITATIVE: 0.8 MG/L FEU (ref 0–0.59)
DIFFERENTIAL TYPE: ABNORMAL
EOSINOPHILS RELATIVE PERCENT: 10 % (ref 1–4)
GFR AFRICAN AMERICAN: >60 ML/MIN
GFR NON-AFRICAN AMERICAN: >60 ML/MIN
GFR SERPL CREATININE-BSD FRML MDRD: ABNORMAL ML/MIN/{1.73_M2}
GFR SERPL CREATININE-BSD FRML MDRD: ABNORMAL ML/MIN/{1.73_M2}
GLUCOSE BLD-MCNC: 172 MG/DL (ref 70–99)
HCT VFR BLD CALC: 48 % (ref 40.7–50.3)
HEMOGLOBIN: 15.4 G/DL (ref 13–17)
IMMATURE GRANULOCYTES: 0 %
LYMPHOCYTES # BLD: 30 % (ref 24–43)
MCH RBC QN AUTO: 30.9 PG (ref 25.2–33.5)
MCHC RBC AUTO-ENTMCNC: 32.1 G/DL (ref 28.4–34.8)
MCV RBC AUTO: 96.4 FL (ref 82.6–102.9)
MONOCYTES # BLD: 7 % (ref 3–12)
NRBC AUTOMATED: 0 PER 100 WBC
PDW BLD-RTO: 13.1 % (ref 11.8–14.4)
PLATELET # BLD: 198 K/UL (ref 138–453)
PLATELET ESTIMATE: ABNORMAL
PMV BLD AUTO: 10 FL (ref 8.1–13.5)
POTASSIUM SERPL-SCNC: 3.6 MMOL/L (ref 3.7–5.3)
PRO-BNP: 1462 PG/ML
RBC # BLD: 4.98 M/UL (ref 4.21–5.77)
RBC # BLD: ABNORMAL 10*6/UL
SARS-COV-2, RAPID: NOT DETECTED
SEG NEUTROPHILS: 52 % (ref 36–65)
SEGMENTED NEUTROPHILS ABSOLUTE COUNT: 4.66 K/UL (ref 1.5–8.1)
SODIUM BLD-SCNC: 135 MMOL/L (ref 135–144)
SPECIMEN DESCRIPTION: NORMAL
TROPONIN INTERP: ABNORMAL
TROPONIN T: ABNORMAL NG/ML
TROPONIN, HIGH SENSITIVITY: 32 NG/L (ref 0–22)
WBC # BLD: 9 K/UL (ref 3.5–11.3)
WBC # BLD: ABNORMAL 10*3/UL

## 2022-02-20 PROCEDURE — 71260 CT THORAX DX C+: CPT

## 2022-02-20 PROCEDURE — 84484 ASSAY OF TROPONIN QUANT: CPT

## 2022-02-20 PROCEDURE — 2700000000 HC OXYGEN THERAPY PER DAY

## 2022-02-20 PROCEDURE — 94640 AIRWAY INHALATION TREATMENT: CPT

## 2022-02-20 PROCEDURE — 85379 FIBRIN DEGRADATION QUANT: CPT

## 2022-02-20 PROCEDURE — 6360000004 HC RX CONTRAST MEDICATION: Performed by: STUDENT IN AN ORGANIZED HEALTH CARE EDUCATION/TRAINING PROGRAM

## 2022-02-20 PROCEDURE — 6370000000 HC RX 637 (ALT 250 FOR IP): Performed by: EMERGENCY MEDICINE

## 2022-02-20 PROCEDURE — 96374 THER/PROPH/DIAG INJ IV PUSH: CPT

## 2022-02-20 PROCEDURE — 83880 ASSAY OF NATRIURETIC PEPTIDE: CPT

## 2022-02-20 PROCEDURE — 87635 SARS-COV-2 COVID-19 AMP PRB: CPT

## 2022-02-20 PROCEDURE — 2580000003 HC RX 258: Performed by: STUDENT IN AN ORGANIZED HEALTH CARE EDUCATION/TRAINING PROGRAM

## 2022-02-20 PROCEDURE — 71045 X-RAY EXAM CHEST 1 VIEW: CPT

## 2022-02-20 PROCEDURE — 85025 COMPLETE CBC W/AUTO DIFF WBC: CPT

## 2022-02-20 PROCEDURE — 6360000002 HC RX W HCPCS: Performed by: EMERGENCY MEDICINE

## 2022-02-20 PROCEDURE — 94761 N-INVAS EAR/PLS OXIMETRY MLT: CPT

## 2022-02-20 PROCEDURE — 99285 EMERGENCY DEPT VISIT HI MDM: CPT

## 2022-02-20 PROCEDURE — 93005 ELECTROCARDIOGRAM TRACING: CPT | Performed by: EMERGENCY MEDICINE

## 2022-02-20 PROCEDURE — 80048 BASIC METABOLIC PNL TOTAL CA: CPT

## 2022-02-20 RX ORDER — MORPHINE SULFATE 2 MG/ML
2 INJECTION, SOLUTION INTRAMUSCULAR; INTRAVENOUS ONCE
Status: DISCONTINUED | OUTPATIENT
Start: 2022-02-20 | End: 2022-02-20

## 2022-02-20 RX ORDER — ONDANSETRON 2 MG/ML
4 INJECTION INTRAMUSCULAR; INTRAVENOUS ONCE
Status: DISCONTINUED | OUTPATIENT
Start: 2022-02-20 | End: 2022-02-20

## 2022-02-20 RX ORDER — IPRATROPIUM BROMIDE AND ALBUTEROL SULFATE 2.5; .5 MG/3ML; MG/3ML
1 SOLUTION RESPIRATORY (INHALATION) ONCE
Status: COMPLETED | OUTPATIENT
Start: 2022-02-20 | End: 2022-02-20

## 2022-02-20 RX ORDER — ALBUTEROL SULFATE 90 UG/1
2 AEROSOL, METERED RESPIRATORY (INHALATION) 4 TIMES DAILY PRN
Qty: 18 G | Refills: 5 | Status: SHIPPED | OUTPATIENT
Start: 2022-02-20

## 2022-02-20 RX ORDER — 0.9 % SODIUM CHLORIDE 0.9 %
80 INTRAVENOUS SOLUTION INTRAVENOUS ONCE
Status: DISCONTINUED | OUTPATIENT
Start: 2022-02-20 | End: 2022-02-20 | Stop reason: HOSPADM

## 2022-02-20 RX ORDER — PREDNISONE 20 MG/1
60 TABLET ORAL DAILY
Qty: 30 TABLET | Refills: 0 | Status: SHIPPED | OUTPATIENT
Start: 2022-02-20 | End: 2022-03-02

## 2022-02-20 RX ORDER — ASPIRIN 81 MG/1
324 TABLET, CHEWABLE ORAL ONCE
Status: DISCONTINUED | OUTPATIENT
Start: 2022-02-20 | End: 2022-02-20

## 2022-02-20 RX ORDER — SODIUM CHLORIDE 0.9 % (FLUSH) 0.9 %
10 SYRINGE (ML) INJECTION PRN
Status: DISCONTINUED | OUTPATIENT
Start: 2022-02-20 | End: 2022-02-20 | Stop reason: HOSPADM

## 2022-02-20 RX ORDER — GUAIFENESIN 100 MG/5ML
200 SOLUTION ORAL ONCE
Status: COMPLETED | OUTPATIENT
Start: 2022-02-20 | End: 2022-02-20

## 2022-02-20 RX ORDER — AZITHROMYCIN 500 MG/1
500 TABLET, FILM COATED ORAL DAILY
Qty: 3 TABLET | Refills: 0 | Status: SHIPPED | OUTPATIENT
Start: 2022-02-20 | End: 2022-02-23

## 2022-02-20 RX ORDER — METHYLPREDNISOLONE SODIUM SUCCINATE 125 MG/2ML
125 INJECTION, POWDER, LYOPHILIZED, FOR SOLUTION INTRAMUSCULAR; INTRAVENOUS ONCE
Status: COMPLETED | OUTPATIENT
Start: 2022-02-20 | End: 2022-02-20

## 2022-02-20 RX ADMIN — METHYLPREDNISOLONE SODIUM SUCCINATE 125 MG: 125 INJECTION, POWDER, FOR SOLUTION INTRAMUSCULAR; INTRAVENOUS at 17:43

## 2022-02-20 RX ADMIN — GUAIFENESIN 200 MG: 200 SOLUTION ORAL at 17:43

## 2022-02-20 RX ADMIN — IOPAMIDOL 75 ML: 755 INJECTION, SOLUTION INTRAVENOUS at 21:03

## 2022-02-20 RX ADMIN — Medication 80 ML: at 21:02

## 2022-02-20 RX ADMIN — SODIUM CHLORIDE, PRESERVATIVE FREE 10 ML: 5 INJECTION INTRAVENOUS at 21:03

## 2022-02-20 RX ADMIN — IPRATROPIUM BROMIDE AND ALBUTEROL SULFATE 1 AMPULE: 2.5; .5 SOLUTION RESPIRATORY (INHALATION) at 19:12

## 2022-02-20 ASSESSMENT — ENCOUNTER SYMPTOMS
COUGH: 1
SHORTNESS OF BREATH: 1
BACK PAIN: 0
CHEST TIGHTNESS: 0
ABDOMINAL DISTENTION: 0
EYE PAIN: 0
FACIAL SWELLING: 0
ABDOMINAL PAIN: 0
EYE DISCHARGE: 0

## 2022-02-20 NOTE — ED PROVIDER NOTES
EMERGENCY DEPARTMENT ENCOUNTER    Pt Name: Ghada Rosas  MRN: [id number]  [place] 1955  Date of evaluation: 2/20/22  CHIEF COMPLAINT       Chief Complaint   Patient presents with    Shortness of Breath     HISTORY OF PRESENT ILLNESS   HPI   The patient is a 70-year-old male with a history of COPD, hypertension, diabetes who presented to the emergency department by EMS secondary to shortness of breath. Patient said he woke up and he felt well however he went on to the cool air and had sudden onset of shortness of breath, he went in the house to sit down continue to have shortness of breath his brother was called and EMS was subsequently called. Patient stated he is 2-3 episodes per year of a COPD exacerbation requiring steroids and albuterol inhaler. He did use his albuterol inhaler. Not on home oxygen. Does not follow with a pulmonologist and has never been told that he needs home oxygen. Patient is fully vaccinated against Covid with boosters. He denied a previous history of PE, denies recent travel immobility. Denies hemoptysis. Patient denies use of blood thinners such as Coumadin aspirin or Plavix. REVIEW OF SYSTEMS     Review of Systems   Constitutional: Negative for chills, diaphoresis and fever. HENT: Negative for congestion, ear pain and facial swelling. Eyes: Negative for pain, discharge and visual disturbance. Respiratory: Positive for cough and shortness of breath. Negative for chest tightness. Cardiovascular: Negative for chest pain and palpitations. Gastrointestinal: Negative for abdominal distention and abdominal pain. Genitourinary: Negative for difficulty urinating and flank pain. Musculoskeletal: Negative for back pain. Skin: Negative for wound. Neurological: Negative for dizziness, light-headedness and headaches.      PASTMEDICAL HISTORY     Past Medical History:   Diagnosis Date    COPD [facility]chronic obstructive [facility] disease) ([facility] [address]     Diabetes mellitus ([facility] [address]  Edema     Heart attack ([facility] [address]     History of diabetes mellitus, type II     Hypertension     Obesity      SURGICAL HISTORY       Past Surgical History:   Procedure Laterality Date    HERNIA REPAIR      TONSILLECTOMY       CURRENT MEDICATIONS       Previous Medications    ALBUTEROL SULFATE  (90 BASE) MCG/ACT INHALER    Inhale into the lungs    CARVEDILOL (COREG) 6.25 MG TABLET    Take 6.25 tablets by mouth 2 times daily     FUROSEMIDE (LASIX) 40 MG TABLET    Take 40 mg by mouth daily     LISINOPRIL (PRINIVIL;ZESTRIL) 40 MG TABLET    Take 1 tablet by mouth    METFORMIN (GLUCOPHAGE) 500 MG TABLET    Take 500 mg by mouth 2 times daily (with meals)    METHADONE HCL PO    Take 70 mg by mouth daily     MULTIPLE VITAMIN (MULTIVITAMIN) TABS TABLET    Take 1 tablet by mouth daily     ALLERGIES     has No Known Allergies. FAMILY HISTORY     He indicated that his mother is alive. He indicated that his father is . He indicated that his paternal grandfather is . SOCIAL HISTORY       Social History     Tobacco Use    Smoking status: Former Smoker    Smokeless tobacco: Never Used    Tobacco comment: quit Laya Harder couple years ago\"   Vaping Use    Vaping Use: Never used   Substance Use Topics    Alcohol use: Not Currently    Drug use: Yes     Types: Cocaine, Other-see comments     Comment: Pt states using heroin, denies taking methadone currently     PHYSICAL EXAM     INITIAL VITALS: BP (!) 144/88   Pulse 92   Temp 98.4 °F (36.9 °C)   Resp 20   Wt 230 lb (104.3 kg)   SpO2 94%   BMI 32.08 kg/m²    Physical Exam  Vitals and nursing note reviewed. Constitutional:       General: He is not in acute distress. Appearance: He is well-developed. He is not diaphoretic. Interventions: He is not intubated. HENT:      Head: Normocephalic and atraumatic. Eyes:      Pupils: Pupils are equal, round, and reactive to light.    Cardiovascular:      Rate and Rhythm: Normal rate and regular rhythm. Pulmonary:      Effort: Pulmonary effort is normal. No tachypnea, bradypnea, accessory muscle usage or respiratory distress. He is not intubated. Breath sounds: Examination of the right-upper field reveals wheezing. Examination of the left-upper field reveals wheezing. Examination of the right-middle field reveals wheezing. Examination of the left-middle field reveals wheezing. Examination of the right-lower field reveals wheezing. Examination of the left-lower field reveals wheezing. Wheezing present. Abdominal:      General: Bowel sounds are normal.      Palpations: Abdomen is soft. Musculoskeletal:         General: Normal range of motion. Cervical back: Normal range of motion and neck supple. Skin:     General: Skin is warm. Capillary Refill: Capillary refill takes less than 2 seconds. Neurological:      Mental Status: He is alert and oriented to person, place, and time. MEDICAL DECISION MAKING:   Patient is a 18-year-old male with history of COPD who presented to the emergency department secondary to shortness of breath. EKG sinus rhythm no acute ST elevations or findings consistent with STEMI. Patient placed in droplet precautions and rapid Covid obtained. Labs obtained including D-dimer and troponin. Patient received Solu-Medrol and Robitussin. Rapid Covid negative. Patient received DuoNeb breathing treatments. D-dimer slightly elevated, orders placed for CT chest without contrast.  CT pending at this time, patient will be endorsed to Dr. Ugo Sandoval pending CT results, please see his note for final ED disposition. All patient's question's and concerns were answered prior to disposition and patient and/or family expressed understanding and agreement of treatment plan.         CRITICAL CARE:              NIH STROKE SCALE:            PROCEDURES:    Procedures    DIAGNOSTIC RESULTS   EKG:All EKG's are interpreted by the Emergency Department Physician who either signs or Co-signs this chart in the absence of a cardiologist.        RADIOLOGY:All plain film, CT, MRI, and formal ultrasound images (except ED bedside ultrasound) are read by the radiologist, see reports below, unless otherwisenoted in MDM or here. XR CHEST PORTABLE   Final Result   No acute process. CT CHEST PULMONARY EMBOLISM W CONTRAST    (Results Pending)     LABS: All lab results were reviewed by myself, and all abnormals are listed below.   Labs Reviewed   CBC WITH AUTO DIFFERENTIAL - Abnormal; Notable for the following components:       Result Value    Eosinophils % 10 (*)     Absolute Eos # 0.89 (*)     All other components within normal limits   BASIC METABOLIC PANEL W/ REFLEX TO MG FOR LOW K - Abnormal; Notable for the following components:    Glucose 172 (*)     Potassium 3.6 (*)     Chloride 96 (*)     All other components within normal limits   TROPONIN - Abnormal; Notable for the following components:    Troponin, High Sensitivity 32 (*)     All other components within normal limits   BRAIN NATRIURETIC PEPTIDE - Abnormal; Notable for the following components:    Pro-BNP 1,462 (*)     All other components within normal limits   D-DIMER, QUANTITATIVE - Abnormal; Notable for the following components:    D-Dimer, Quant 0.80 (*)     All other components within normal limits   COVID-19, RAPID       EMERGENCY DEPARTMENTCOURSE:         Vitals:    Vitals:    02/20/22 1729 02/20/22 1730 02/20/22 1900 02/20/22 1912   BP:  128/82 (!) 144/88    Pulse: 105 102 92    Resp: 16 14 14 20   Temp:       SpO2: 95% 94% 92% 94%   Weight:           The patient was given the following medications while in the emergency department:  Orders Placed This Encounter   Medications    methylPREDNISolone sodium (SOLU-MEDROL) injection 125 mg    guaiFENesin (ROBITUSSIN) 100 MG/5ML oral solution 200 mg    DISCONTD: aspirin chewable tablet 324 mg    DISCONTD: morphine (PF) injection 2 mg    DISCONTD: ondansetron (ZOFRAN) injection 4 mg    ipratropium-albuterol (DUONEB) nebulizer solution 1 ampule     Order Specific Question:   Initiate RT Bronchodilator Protocol     Answer: Yes    predniSONE (DELTASONE) 20 MG tablet     Sig: Take 3 tablets by mouth daily for 10 days     Dispense:  30 tablet     Refill:  0    albuterol sulfate HFA (VENTOLIN HFA) 108 (90 Base) MCG/ACT inhaler     Sig: Inhale 2 puffs into the lungs 4 times daily as needed for Wheezing     Dispense:  18 g     Refill:  5    azithromycin (ZITHROMAX) 500 MG tablet     Sig: Take 1 tablet by mouth daily for 3 days     Dispense:  3 tablet     Refill:  0     CONSULTS:  None    FINAL IMPRESSION      1. COPD without exacerbation ([facility] [address]          DISPOSITION/PLAN   DISPOSITION  02/20/2022 08:36:32 PM      PATIENT REFERRED TO:  No follow-up provider specified. DISCHARGE MEDICATIONS:  New Prescriptions    ALBUTEROL SULFATE HFA (VENTOLIN HFA) 108 (90 BASE) MCG/ACT INHALER    Inhale 2 puffs into the lungs 4 times daily as needed for Wheezing    AZITHROMYCIN (ZITHROMAX) 500 MG TABLET    Take 1 tablet by mouth daily for 3 days    PREDNISONE (DELTASONE) 20 MG TABLET    Take 3 tablets by mouth daily for 10 days     Tito Elizalde MD  Attending Emergency Physician      The care is provided during an unprecedented national emergency due to the novel coronavirus, COVID 19. This note was created with the assistance of a speech-recognition program. While intending to generate a document that actually reflects the content of the visit, no guarantees can be provided that every mistake has been identified and corrected by editing.     Naheed Luong MD  66/[birth year]/54 3654

## 2022-02-21 LAB
EKG ATRIAL RATE: 113 BPM
EKG P AXIS: 72 DEGREES
EKG P-R INTERVAL: 174 MS
EKG Q-T INTERVAL: 372 MS
EKG QRS DURATION: 140 MS
EKG QTC CALCULATION (BAZETT): 510 MS
EKG R AXIS: 40 DEGREES
EKG T AXIS: 67 DEGREES
EKG VENTRICULAR RATE: 113 BPM

## 2022-02-21 PROCEDURE — 93010 ELECTROCARDIOGRAM REPORT: CPT | Performed by: INTERNAL MEDICINE

## 2022-02-21 NOTE — ED PROVIDER NOTES
[address] [place] [address] ED  Emergency Department  Emergency Medicine     Care of Luis Amaral was assumed from previous provider and is being seen for Shortness of Breath  . The patient's initial evaluation and plan have been discussed with the prior provider who initially evaluated the patient. EMERGENCY DEPARTMENT COURSE / MEDICAL DECISION MAKING:       MEDICATIONS GIVEN:  Orders Placed This Encounter   Medications    methylPREDNISolone sodium (SOLU-MEDROL) injection 125 mg    guaiFENesin (ROBITUSSIN) 100 MG/5ML oral solution 200 mg    DISCONTD: aspirin chewable tablet 324 mg    DISCONTD: morphine (PF) injection 2 mg    DISCONTD: ondansetron (ZOFRAN) injection 4 mg    ipratropium-albuterol (DUONEB) nebulizer solution 1 ampule     Order Specific Question:   Initiate RT Bronchodilator Protocol     Answer:    Yes    predniSONE (DELTASONE) 20 MG tablet     Sig: Take 3 tablets by mouth daily for 10 days     Dispense:  30 tablet     Refill:  0    albuterol sulfate HFA (VENTOLIN HFA) 108 (90 Base) MCG/ACT inhaler     Sig: Inhale 2 puffs into the lungs 4 times daily as needed for Wheezing     Dispense:  18 g     Refill:  5    azithromycin (ZITHROMAX) 500 MG tablet     Sig: Take 1 tablet by mouth daily for 3 days     Dispense:  3 tablet     Refill:  0    iopamidol (ISOVUE-370) 76 % injection 75 mL    sodium chloride flush 0.9 % injection 10 mL    0.9 % sodium chloride bolus       LABS / RADIOLOGY:     Labs Reviewed   CBC WITH AUTO DIFFERENTIAL - Abnormal; Notable for the following components:       Result Value    Eosinophils % 10 (*)     Absolute Eos # 0.89 (*)     All other components within normal limits   BASIC METABOLIC PANEL W/ REFLEX TO MG FOR LOW K - Abnormal; Notable for the following components:    Glucose 172 (*)     Potassium 3.6 (*)     Chloride 96 (*)     All other components within normal limits   TROPONIN - Abnormal; Notable for the following components:    Troponin, High Sensitivity 32 (*) All other components within normal limits   BRAIN NATRIURETIC PEPTIDE - Abnormal; Notable for the following components:    Pro-BNP 1,462 (*)     All other components within normal limits   D-DIMER, QUANTITATIVE - Abnormal; Notable for the following components:    D-Dimer, Quant 0.80 (*)     All other components within normal limits   COVID-19, RAPID       XR CHEST PORTABLE    Result Date: 2/20/2022  EXAMINATION: ONE XRAY VIEW OF THE CHEST 2/20/2022 5:48 pm COMPARISON: 03/07/2021 HISTORY: ORDERING SYSTEM PROVIDED HISTORY: sob TECHNOLOGIST PROVIDED HISTORY: sob Reason for Exam: SOB, history of COPD FINDINGS: The lungs are without acute focal process. There is no effusion or pneumothorax. The cardiomediastinal silhouette is without acute process. The osseous structures are without acute process. No acute process. RECENT VITALS:     Temp: 98.4 °F (36.9 °C),  Pulse: 92, Resp: 20, BP: (!) 144/88, SpO2: 94 %         This patient is a 78 y.o. Male with shortness of breath. Concern for COPD exacerbation. D-dimer elevated. Awaiting CT rule out PE. Otherwise discharged with steroids antibiotics. CT PE study negative. Antibiotics and steroid burst written by previous physician. Reassessed in no acute distress. FINAL IMPRESSION:     1.  COPD exacerbation ([address]        DISPOSITION:         DISPOSITION:  [x]  Discharge   []  Transfer -    []  Admission -     []  Against Medical Advice   []  Eloped   FOLLOW-UP: Johnathan Hewitt PA-C  [address]  [phone]    Schedule an appointment as soon as possible for a visit in 1 week  As needed     DISCHARGE MEDICATIONS: New Prescriptions    ALBUTEROL SULFATE HFA (VENTOLIN HFA) 108 (90 BASE) MCG/ACT INHALER    Inhale 2 puffs into the lungs 4 times daily as needed for Wheezing    AZITHROMYCIN (ZITHROMAX) 500 MG TABLET    Take 1 tablet by mouth daily for 3 days    PREDNISONE (DELTASONE) 20 MG TABLET    Take 3 tablets by mouth daily for 10 days          Lesa White, DO  Emergency Medicine       Lesa White DO  02/20/22 0931

## 2024-06-15 ENCOUNTER — HOSPITAL ENCOUNTER (OUTPATIENT)
Dept: GENERAL RADIOLOGY | Age: 69
End: 2024-06-15
Payer: MEDICARE

## 2024-06-15 ENCOUNTER — HOSPITAL ENCOUNTER (OUTPATIENT)
Age: 69
End: 2024-06-15
Payer: MEDICARE

## 2024-06-15 DIAGNOSIS — M75.102 TEAR OF LEFT ROTATOR CUFF, UNSPECIFIED TEAR EXTENT, UNSPECIFIED WHETHER TRAUMATIC: ICD-10-CM

## 2024-06-15 PROCEDURE — 73030 X-RAY EXAM OF SHOULDER: CPT

## 2024-12-30 NOTE — ED PROVIDER NOTES
EMERGENCY DEPARTMENT ENCOUNTER    Pt Name: Matt Martinez  MRN: [id number]  [place] 1955  Date of evaluation: 8/14/20  CHIEF COMPLAINT       Chief Complaint   Patient presents with    Abdominal Pain     HISTORY OF PRESENT ILLNESS   68-year-old male presents to the emergency room for abdominal pain. Patient states that pain started last night in the evening and has steadily worsened throughout the night. He has not had any vomiting with it but does have some mild nausea. Patient states he has not had a bowel movement in a day and a half. He states that he has not been passing gas. Patient has history of diabetes COPD coronary artery disease and hypertension. He does report previous hernia surgery. REVIEW OF SYSTEMS     Review of Systems   Gastrointestinal: Positive for abdominal distention and abdominal pain. PASTMEDICAL HISTORY     Past Medical History:   Diagnosis Date    [facility]     Diabetes mellitus [facility]     Edema     Heart attack ([facility]     History of diabetes mellitus, type II     Hypertension     Obesity      Past Problem List  Patient Active Problem List   Diagnosis Code    Cellulitis of left lower extremity L03. 116    Mixed simple and mucopurulent chronic bronchitis ([facility]) J41.8    Coronary artery disease involving native coronary artery of native heart without angina pectoris I25.10    Essential hypertension I10    Controlled type 2 diabetes mellitus with foot ulcer, without long-term current use of insulin ([facility]) E11.621, L97.509    Foot ulcer, left ([facility]) L97.529    Chronic heart failure ([facility]) I50.9    MRSA infection A49.02    Acute pancreatitis K85.90     SURGICAL HISTORY       Past Surgical History:   Procedure Laterality Date    HERNIA REPAIR      TONSILLECTOMY       CURRENT MEDICATIONS       Previous Medications    ALBUTEROL SULFATE  (90 BASE) MCG/ACT INHALER    Inhale into the lungs    CARVEDILOL (COREG) 6.25 MG TABLET    Take 6.25 tablets by mouth 2 times daily     FUROSEMIDE (LASIX) 40 MG TABLET    Take 40 mg by mouth daily     LISINOPRIL (PRINIVIL;ZESTRIL) 40 MG TABLET    Take 1 tablet by mouth    METFORMIN (GLUCOPHAGE) 500 MG TABLET    Take 500 mg by mouth 2 times daily (with meals)    METHADONE HCL PO    Take 70 mg by mouth daily      ALLERGIES     has No Known Allergies. FAMILY HISTORY     He indicated that his mother is alive. He indicated that his father is . He indicated that his paternal grandfather is . SOCIAL HISTORY       Social History     Tobacco Use    Smoking status: Former Smoker    Smokeless tobacco: Never Used   Substance Use Topics    Alcohol use: Yes     Comment: occasional    Drug use: No     Comment: history of drug abuse, pt takes methadone     PHYSICAL EXAM     INITIAL VITALS: BP (!) 190/84   Pulse 68   Temp 97.8 °F (36.6 °C) (Oral)   Resp 19   Ht 5' 11\" (1.803 m)   Wt 265 lb (120.2 kg)   SpO2 93%   BMI 36.96 kg/m²    Physical Exam  Constitutional:       General: He is not in acute distress. Appearance: He is well-developed. HENT:      Head: Normocephalic. Eyes:      Pupils: Pupils are equal, round, and reactive to light. Cardiovascular:      Rate and Rhythm: Normal rate and regular rhythm. Heart sounds: Normal heart sounds. Pulmonary:      Effort: Pulmonary effort is normal. No respiratory distress. Breath sounds: Normal breath sounds. Abdominal:      General: Bowel sounds are decreased. There is distension. Tenderness: There is generalized abdominal tenderness. Musculoskeletal: Normal range of motion. Skin:     General: Skin is warm and dry. Neurological:      Mental Status: He is alert and oriented to person, place, and time. MEDICAL DECISION MAKIN-year-old male here in the emergency room with generalized abdominal pain most localized to the right upper quadrant and epigastric area.   Patient has generalized tenderness as well as fair amount of distention on exam.  Patient has a lipase of 1200 as well as CT imaging confirming pancreatic inflammation. Case discussed with [facility] for admission. CRITICAL CARE:       PROCEDURES:    Procedures    DIAGNOSTIC RESULTS   EKG:All EKG's are interpreted by the Emergency Department Physician who either signs or Co-signs this chart in the absence of a cardiologist.    EKG- sinus with rate 64  RBBB  Nonspecific ST changes  QTc 468    RADIOLOGY:All plain film, CT, MRI, and formal ultrasound images (except ED bedside ultrasound) are read by the radiologist, see reports below, unless otherwisenoted in MDM or here. CT ABDOMEN PELVIS W IV CONTRAST Additional Contrast? None   Final Result   1. Acute edematous interstitial pancreatitis. Peripancreatic inflammatory   and edematous changes without discrete fluid collection. 2. Two adjacent globular moderately hyperdense pancreatic tail foci following   attenuation of adjacent vessels measuring 1.5 cm and 2.0 cm. Appearance is   concerning for a pseudoaneurysm with possible adjacent hematoma. This would   be better assessed with a multiphasic contrast enhanced exam.   3. A 5.7 cm left renal cyst.           LABS: All lab results were reviewed by myself, and all abnormals are listed below.   Labs Reviewed   CBC WITH AUTO DIFFERENTIAL - Abnormal; Notable for the following components:       Result Value    WBC 13.2 (*)     Seg Neutrophils 78 (*)     Lymphocytes 13 (*)     Immature Granulocytes 1 (*)     Segs Absolute 10.38 (*)     All other components within normal limits   BASIC METABOLIC PANEL W/ REFLEX TO MG FOR LOW K - Abnormal; Notable for the following components:    Glucose 234 (*)     Sodium 134 (*)     Chloride 95 (*)     All other components within normal limits   HEPATIC FUNCTION PANEL - Abnormal; Notable for the following components:    ALT 42 (*)     AST 40 (*)     All other components within normal limits   LIPASE - Abnormal; Notable for the following components:    Lipase 1,200 (*)     All other components within normal limits   TROPONIN   ETHANOL   CALCIUM, IONIZED   PHOSPHORUS   TSH WITHOUT REFLEX   HEMOGLOBIN A1C   COMPREHENSIVE METABOLIC PANEL W/ REFLEX TO MG FOR LOW K   MAGNESIUM   TRIGLYCERIDES   LIPASE   AMYLASE   CBC   PROTIME-INR       EMERGENCY DEPARTMENTCOURSE:         Vitals:    Vitals:    08/14/20 1542 08/14/20 1552 08/14/20 1744 08/14/20 1758   BP: (!) 179/72 (!) 173/85 (!) 195/90 (!) 190/84   Pulse: 63 73 58 68   Resp: 18 23 18 19   Temp:       TempSrc:       SpO2: 97% 92% 93% 93%   Weight:       Height:           The patient was given the following medications while in the emergency department:  Orders Placed This Encounter   Medications    morphine sulfate (PF) injection 4 mg    ondansetron (ZOFRAN) injection 4 mg    HYDROmorphone HCl PF (DILAUDID) injection 1 mg    0.9 % sodium chloride bolus    iopamidol (ISOVUE-370) 76 % injection 75 mL    sodium chloride flush 0.9 % injection 10 mL    HYDROmorphone HCl PF (DILAUDID) injection 1 mg    DISCONTD: carvedilol (COREG) tablet 6.25 mg    furosemide (LASIX) tablet 40 mg    lisinopril (PRINIVIL;ZESTRIL) tablet 40 mg    metFORMIN (GLUCOPHAGE) tablet 500 mg    FOLLOWED BY Linked Order Group     lactated ringers infusion     lactated ringers infusion    0.9 % sodium chloride infusion    potassium chloride 10 mEq/100 mL IVPB (Peripheral Line)    magnesium sulfate 1 g in dextrose 5% 100 mL IVPB    acetaminophen (TYLENOL) tablet 650 mg    OR Linked Order Group     HYDROcodone-acetaminophen (NORCO) 5-325 MG per tablet 1 tablet     HYDROcodone-acetaminophen (NORCO) 5-325 MG per tablet 2 tablet    OR Linked Order Group     HYDROmorphone HCl PF (DILAUDID) injection 0.25 mg     HYDROmorphone HCl PF (DILAUDID) injection 0.5 mg    OR Linked Order Group     promethazine (PHENERGAN) tablet 12.5 mg     ondansetron (ZOFRAN) injection 4 mg    famotidine (PEPCID) injection 20 mg    enoxaparin (LOVENOX) injection 30 mg    0.9 % sodium chloride infusion    sodium chloride flush 0.9 % injection 10 mL    sodium chloride flush 0.9 % injection 10 mL    vitamin B-1 (THIAMINE) tablet 100 mg    multivitamin 1 tablet    OR Linked Order Group     LORazepam (ATIVAN) tablet 1 mg     LORazepam (ATIVAN) injection 1 mg     LORazepam (ATIVAN) tablet 2 mg     LORazepam (ATIVAN) injection 2 mg     LORazepam (ATIVAN) tablet 3 mg     LORazepam (ATIVAN) injection 3 mg     LORazepam (ATIVAN) tablet 4 mg     LORazepam (ATIVAN) injection 4 mg    carvedilol (COREG) tablet 6.25 mg     CONSULTS:  IP CONSULT TO INTERNAL MEDICINE  IP CONSULT TO SOCIAL WORK  IP CONSULT TO GI    FINAL IMPRESSION    No diagnosis found.       DISPOSITION/PLAN   DISPOSITION Admitted 08/14/2020 05:13:19 PM      PATIENT REFERRED TO:  Elissa Remy PA-C  [facility]  [phone]          DISCHARGE MEDICATIONS:  New Prescriptions    No medications on file     Rebecca Bahena MD  Attending Emergency Physician                 Paul Moscoso MD  08/14/20 0310 No

## 2025-05-23 LAB
ALBUMIN: 3.5 G/DL
ALK PHOSPHATASE: 143 U/L
ALT SERPL-CCNC: 26 U/L
AST SERPL-CCNC: 33 U/L
BASOPHILS # BLD: 0.05 X10^3UL
BASOPHILS RELATIVE PERCENT: 0.5 %
BILIRUB SERPL-MCNC: 0.9 MG/DL
BUN BLDV-MCNC: 18 MG/DL
CALCIUM SERPL-MCNC: 9.2 MG/DL
CHLORIDE BLD-SCNC: 93 MMOL/L
CO2: 32 MMOL/L
CREAT SERPL-MCNC: 0.76 MG/DL
EGFR (CKD-EPI): 96.1 ML/M1.7
EOSINOPHIL # BLD: 0.38 X10^3UL
EOSINOPHILS RELATIVE PERCENT: 3.9 %
ERYTHROCYTE [DISTWIDTH] IN BLOOD BY AUTOMATED COUNT: 43.6 FL
ESTIMATED AVERAGE GLUCOSE: 220 MG/DL
GLUCOSE: 393 MG/DL
HBA1C MFR BLD: 9.3 %
HCT VFR BLD CALC: 41.6 %
HEMOGLOBIN: 14 G/DL
IMMATURE GRANULOCYTES %: 0.05 X10^3UL
IMMATURE GRANULOCYTES %: 0.5 %
LYMPHOCYTES # BLD: 1.93 X10^3UL
LYMPHOCYTES RELATIVE PERCENT: 19.7 %
MCH RBC QN AUTO: 32.4 PG
MCHC RBC AUTO-ENTMCNC: 33.7 G/DL
MCV RBC AUTO: 96.3 FL
MONOCYTES RELATIVE PERCENT: 8.1 %
MONOCYTES: 0.79 X10^3UL
NEUTROPHILS RELATIVE PERCENT: 67.3 %
NEUTROPHILS: 6.6 X10^3UL
PLATELET # BLD: 313 X10^3UL
POTASSIUM SERPL-SCNC: 4.7 MMOL/L
RBC # BLD: 4.32 X10^6UL
SODIUM BLD-SCNC: 135 MMOL/L
TOTAL PROTEIN: 7 G/DL
WBC # BLD: 9.8 X10^3UL